# Patient Record
Sex: MALE | Race: BLACK OR AFRICAN AMERICAN | NOT HISPANIC OR LATINO | URBAN - METROPOLITAN AREA
[De-identification: names, ages, dates, MRNs, and addresses within clinical notes are randomized per-mention and may not be internally consistent; named-entity substitution may affect disease eponyms.]

---

## 2020-04-06 ENCOUNTER — INPATIENT (INPATIENT)
Facility: HOSPITAL | Age: 75
LOS: 7 days | Discharge: HOME HEALTH | DRG: 177 | End: 2020-04-14
Attending: PEDIATRICS | Admitting: STUDENT IN AN ORGANIZED HEALTH CARE EDUCATION/TRAINING PROGRAM
Payer: COMMERCIAL

## 2020-04-06 VITALS
DIASTOLIC BLOOD PRESSURE: 72 MMHG | SYSTOLIC BLOOD PRESSURE: 113 MMHG | RESPIRATION RATE: 16 BRPM | HEIGHT: 67 IN | TEMPERATURE: 99 F | HEART RATE: 116 BPM | OXYGEN SATURATION: 91 % | WEIGHT: 154.98 LBS

## 2020-04-06 DIAGNOSIS — E87.0 HYPEROSMOLALITY AND HYPERNATREMIA: ICD-10-CM

## 2020-04-06 DIAGNOSIS — R09.02 HYPOXEMIA: ICD-10-CM

## 2020-04-06 DIAGNOSIS — G93.40 ENCEPHALOPATHY, UNSPECIFIED: ICD-10-CM

## 2020-04-06 DIAGNOSIS — R68.89 OTHER GENERAL SYMPTOMS AND SIGNS: ICD-10-CM

## 2020-04-06 DIAGNOSIS — Z29.9 ENCOUNTER FOR PROPHYLACTIC MEASURES, UNSPECIFIED: ICD-10-CM

## 2020-04-06 DIAGNOSIS — R79.89 OTHER SPECIFIED ABNORMAL FINDINGS OF BLOOD CHEMISTRY: ICD-10-CM

## 2020-04-06 DIAGNOSIS — Z71.89 OTHER SPECIFIED COUNSELING: ICD-10-CM

## 2020-04-06 DIAGNOSIS — Z98.890 OTHER SPECIFIED POSTPROCEDURAL STATES: Chronic | ICD-10-CM

## 2020-04-06 LAB
ALBUMIN SERPL ELPH-MCNC: 3.3 G/DL — SIGNIFICANT CHANGE UP (ref 3.3–5)
ALP SERPL-CCNC: 66 U/L — SIGNIFICANT CHANGE UP (ref 40–120)
ALT FLD-CCNC: 49 U/L — HIGH (ref 10–45)
ANION GAP SERPL CALC-SCNC: 9 MMOL/L — SIGNIFICANT CHANGE UP (ref 5–17)
APPEARANCE UR: CLEAR — SIGNIFICANT CHANGE UP
AST SERPL-CCNC: 77 U/L — HIGH (ref 10–40)
BACTERIA # UR AUTO: NEGATIVE — SIGNIFICANT CHANGE UP
BASOPHILS # BLD AUTO: 0 K/UL — SIGNIFICANT CHANGE UP (ref 0–0.2)
BASOPHILS NFR BLD AUTO: 0 % — SIGNIFICANT CHANGE UP (ref 0–2)
BILIRUB SERPL-MCNC: 2.6 MG/DL — HIGH (ref 0.2–1.2)
BILIRUB UR-MCNC: ABNORMAL
BUN SERPL-MCNC: 21 MG/DL — SIGNIFICANT CHANGE UP (ref 7–23)
CALCIUM SERPL-MCNC: 9.2 MG/DL — SIGNIFICANT CHANGE UP (ref 8.4–10.5)
CHLORIDE SERPL-SCNC: 135 MMOL/L — HIGH (ref 96–108)
CO2 SERPL-SCNC: 23 MMOL/L — SIGNIFICANT CHANGE UP (ref 22–31)
COLOR SPEC: ABNORMAL
CREAT SERPL-MCNC: 1.09 MG/DL — SIGNIFICANT CHANGE UP (ref 0.5–1.3)
D DIMER BLD IA.RAPID-MCNC: 454 NG/ML DDU — HIGH
DIFF PNL FLD: ABNORMAL
EOSINOPHIL # BLD AUTO: 0 K/UL — SIGNIFICANT CHANGE UP (ref 0–0.5)
EOSINOPHIL NFR BLD AUTO: 0 % — SIGNIFICANT CHANGE UP (ref 0–6)
EPI CELLS # UR: 2 /HPF — SIGNIFICANT CHANGE UP
ERYTHROCYTE [SEDIMENTATION RATE] IN BLOOD: 66 MM/HR — HIGH (ref 0–20)
FIBRINOGEN PPP-MCNC: 511 MG/DL — HIGH (ref 350–510)
GAS PNL BLDV: SIGNIFICANT CHANGE UP
GLUCOSE SERPL-MCNC: 135 MG/DL — HIGH (ref 70–99)
GLUCOSE UR QL: NEGATIVE — SIGNIFICANT CHANGE UP
HCT VFR BLD CALC: 35.6 % — LOW (ref 39–50)
HGB BLD-MCNC: 11 G/DL — LOW (ref 13–17)
HYALINE CASTS # UR AUTO: 6 /LPF — HIGH (ref 0–2)
IMM GRANULOCYTES NFR BLD AUTO: 0.5 % — SIGNIFICANT CHANGE UP (ref 0–1.5)
KETONES UR-MCNC: ABNORMAL
LDH SERPL L TO P-CCNC: 564 U/L — HIGH (ref 50–242)
LEUKOCYTE ESTERASE UR-ACNC: NEGATIVE — SIGNIFICANT CHANGE UP
LYMPHOCYTES # BLD AUTO: 0.67 K/UL — LOW (ref 1–3.3)
LYMPHOCYTES # BLD AUTO: 30.6 % — SIGNIFICANT CHANGE UP (ref 13–44)
MCHC RBC-ENTMCNC: 30.3 PG — SIGNIFICANT CHANGE UP (ref 27–34)
MCHC RBC-ENTMCNC: 30.9 GM/DL — LOW (ref 32–36)
MCV RBC AUTO: 98.1 FL — SIGNIFICANT CHANGE UP (ref 80–100)
MONOCYTES # BLD AUTO: 0.24 K/UL — SIGNIFICANT CHANGE UP (ref 0–0.9)
MONOCYTES NFR BLD AUTO: 11 % — SIGNIFICANT CHANGE UP (ref 2–14)
NEUTROPHILS # BLD AUTO: 1.27 K/UL — LOW (ref 1.8–7.4)
NEUTROPHILS NFR BLD AUTO: 57.9 % — SIGNIFICANT CHANGE UP (ref 43–77)
NITRITE UR-MCNC: NEGATIVE — SIGNIFICANT CHANGE UP
NRBC # BLD: 0 /100 WBCS — SIGNIFICANT CHANGE UP (ref 0–0)
PH UR: 6 — SIGNIFICANT CHANGE UP (ref 5–8)
PLATELET # BLD AUTO: 207 K/UL — SIGNIFICANT CHANGE UP (ref 150–400)
POTASSIUM SERPL-MCNC: 3.8 MMOL/L — SIGNIFICANT CHANGE UP (ref 3.5–5.3)
POTASSIUM SERPL-SCNC: 3.8 MMOL/L — SIGNIFICANT CHANGE UP (ref 3.5–5.3)
PROCALCITONIN SERPL-MCNC: 0.13 NG/ML — HIGH (ref 0.02–0.1)
PROT SERPL-MCNC: 6.6 G/DL — SIGNIFICANT CHANGE UP (ref 6–8.3)
PROT UR-MCNC: 100 — SIGNIFICANT CHANGE UP
RBC # BLD: 3.63 M/UL — LOW (ref 4.2–5.8)
RBC # FLD: 11.8 % — SIGNIFICANT CHANGE UP (ref 10.3–14.5)
RBC CASTS # UR COMP ASSIST: 4 /HPF — SIGNIFICANT CHANGE UP (ref 0–4)
SARS-COV-2 RNA SPEC QL NAA+PROBE: DETECTED
SODIUM SERPL-SCNC: 167 MMOL/L — CRITICAL HIGH (ref 135–145)
SP GR SPEC: 1.01 — SIGNIFICANT CHANGE UP (ref 1.01–1.02)
UROBILINOGEN FLD QL: ABNORMAL
WBC # BLD: 2.19 K/UL — LOW (ref 3.8–10.5)
WBC # FLD AUTO: 2.19 K/UL — LOW (ref 3.8–10.5)
WBC UR QL: 12 /HPF — HIGH (ref 0–5)

## 2020-04-06 PROCEDURE — 93010 ELECTROCARDIOGRAM REPORT: CPT | Mod: NC

## 2020-04-06 PROCEDURE — 70450 CT HEAD/BRAIN W/O DYE: CPT | Mod: 26

## 2020-04-06 PROCEDURE — 99221 1ST HOSP IP/OBS SF/LOW 40: CPT | Mod: AI

## 2020-04-06 PROCEDURE — 71045 X-RAY EXAM CHEST 1 VIEW: CPT | Mod: 26

## 2020-04-06 PROCEDURE — 99285 EMERGENCY DEPT VISIT HI MDM: CPT

## 2020-04-06 RX ORDER — ENOXAPARIN SODIUM 100 MG/ML
40 INJECTION SUBCUTANEOUS DAILY
Refills: 0 | Status: DISCONTINUED | OUTPATIENT
Start: 2020-04-06 | End: 2020-04-14

## 2020-04-06 RX ORDER — SODIUM CHLORIDE 9 MG/ML
1000 INJECTION INTRAMUSCULAR; INTRAVENOUS; SUBCUTANEOUS
Refills: 0 | Status: DISCONTINUED | OUTPATIENT
Start: 2020-04-06 | End: 2020-04-06

## 2020-04-06 RX ORDER — SODIUM CHLORIDE 9 MG/ML
1000 INJECTION, SOLUTION INTRAVENOUS
Refills: 0 | Status: DISCONTINUED | OUTPATIENT
Start: 2020-04-06 | End: 2020-04-07

## 2020-04-06 RX ADMIN — SODIUM CHLORIDE 100 MILLILITER(S): 9 INJECTION, SOLUTION INTRAVENOUS at 23:30

## 2020-04-06 NOTE — H&P ADULT - ASSESSMENT
74M no PMH (on no medications) who presented to the ED with generalized weakness and confusion in the setting of poor PO and depressed mood, found to be hypernatremic and hypoxic with suspicion for COVID infection.

## 2020-04-06 NOTE — H&P ADULT - PROBLEM SELECTOR PLAN 7
Discussed GOC with patient's daughter, Julieta.  Patient is full code at this time.  HCPs are patient's wife and daughter [phone]

## 2020-04-06 NOTE — ED PROVIDER NOTE - OBJECTIVE STATEMENT
75 y/o M without PMH presents to ED BIBEMS for weakness. Patient denies pain at this time. Knows first name. Unsure of last name, where he is, and his birthday.   History given by his daughter over the phone. States that since March 26, when patient's mother passed away he has been "depressed," with total body weakness, with decreased appetite. States that he has not been acting like himself. Baseline A+Ox3. Denies any vomiting or diarrhea. Denies PMH and daily medications. Patient lives with daughter.   Daughter is a EMT, gave patient 2L IVF.   PCP: Ravin Galvan   Daughter: [phone]

## 2020-04-06 NOTE — H&P ADULT - NSHPREVIEWOFSYSTEMS_GEN_ALL_CORE
A&O x1  As per HPI, daughter noted depressed mood, generalized weakness, confusion, decreased appetite.

## 2020-04-06 NOTE — ED ADULT NURSE REASSESSMENT NOTE - NS ED NURSE REASSESS COMMENT FT1
Report received from MAGDALENA Damon. Pt is resting comfortably in bed. Pt A&Ox1 (Only to first name, as per daughter pt baseline is A&Ox3). Pt informed of POC and awaiting COVID test and radiology. Pt has no complaints at this time. Pt safety maintained.

## 2020-04-06 NOTE — H&P ADULT - NSHPLABSRESULTS_GEN_ALL_CORE
EK2020 20:20  Rate: 100  Interpretation: normal  Axis: Normal  WI: 120  QRS: 78  ·Other Findings: non specific ST abnormality, qtc 410 Labs:                         11.0   2.19  )-----------( 207      ( 2020 19:08 )             35.6   04-06    167<HH>  |  135<H>  |  21  ----------------------------<  135<H>  3.8   |  23  |  1.09    Ca    9.2      2020 19:08    TPro  6.6  /  Alb  3.3  /  TBili  2.6<H>  /  DBili  x   /  AST  77<H>  /  ALT  49<H>  /  AlkPhos  66  04-    , Procalcitonin 0.13, D-dimer 454, Fibrinogen 511. Urinalysis - 12 WBC, trace blood, 8 urobilinogen      Imaging:     < from: CT Head No Cont (20 @ 19:35) >    FINDINGS:      There is no hydrocephalus, mass effect, midline shift, vasogenic edema, or acute intracranial hemorrhage.  There is no CT evidence of acute territorial infarct.      The visualized paranasal sinuses and mastoid air cells are clear.    IMPRESSION:    No acute intracranial hemorrhage, mass effect, vasogenic edema, or evidence of acute territorial infarct.    < end of copied text >      < from: Xray Chest 1 View AP/PA (20 @ 19:20) >    IMPRESSION: Patchy opacities in the left lower lung field and likely the right upper lobe, which may be secondary to infection including atypical infection given the current environment.    < end of copied text >        EK2020 20:20  Rate: 100  Interpretation: normal  Axis: Normal  KS: 120  QRS: 78  ·Other Findings: non specific ST abnormality, qtc 410

## 2020-04-06 NOTE — H&P ADULT - HISTORY OF PRESENT ILLNESS
Portions of this History and Physical Exam are adopted from ER Provider Notation per [facility] policy to limit healthcare provider exposure during COVID19 Pandemic    74M PMH  presented to the ED by EMS with generalized weakness and confusion. Per daughter (EMT) he is A&Ox3 at baseline, his mother passed away 3/26 and he has since been depressed, with total body weakness, decreased appetite, and change in behavior. Denied vomiting, diarrhea, pain.   His daughter is an EMT, and she gave him 2L IV saline prior to arrival. Portions of this History and Physical Exam are adopted from ER Provider Notation per [facility] policy to limit healthcare provider exposure during COVID19 Pandemic    74M no PMH (on no medications) who presented to the ED by EMS with generalized weakness and confusion. Per daughter (EMT) he is A&Ox3 at baseline, his mother passed away 3/26 and he has since been depressed, with total body weakness, decreased appetite, and change in behavior. Denied vomiting, diarrhea, pain, fever.   His daughter is an EMT, and she gave him 2L IV saline prior to arrival.    In the ED, he was altered from baseline at A&Ox1, afebrile, tachycardic to 116 bpm, BP (116-130)/(66-78), RR 16-23, SpO2 88% on RA improved to 98% on 2L NC.   Initial labs: WBC 2.19, Hgb 11.0, Plt 207, Na 167, K 3.8, BUN 21, Cr 1.09, T-bili 2.6, AST/ALT 77/45, , Procalcitonin 0.13, D-dimer 454, Fibrinogen 511. Urinalysis - 12 WBC, trace blood, 8 urobilinogen  Initial Imaging: CT Head wnl, CXR with patchy opacities in the LLL field and possibly RUL field, consistent w/ possible infection.    In the ED, he was started on IV  cc/hr. BCx x2, UCx, and COVID test sent.

## 2020-04-06 NOTE — ED ADULT TRIAGE NOTE - MODE OF ARRIVAL
EMS Detail Level: Detailed Ambulance Add 49098 Cpt? (Important Note: In 2017 The Use Of 91904 Is Being Tracked By Cms To Determine Future Global Period Reimbursement For Global Periods): no

## 2020-04-06 NOTE — H&P ADULT - NSHPSOCIALHISTORY_GEN_ALL_CORE
Lives with daughter. Lives with daughter.   Former smoker (quit 36 years ago, smoked for ~25 years).  No alcohol or drug use.

## 2020-04-06 NOTE — H&P ADULT - PROBLEM SELECTOR PLAN 5
Patient noted to have transaminitis AST/ALT 77/45, and elevated T-bili 2.6. INR pending. Daughter denies significant history of alcohol use. Transaminitis may be explained by potential COVID infection, although it is unclear whether bilirubin elevation is predominantly direct or indirect.  - Follow-up repeat bilirubin and D-bilirubin level. Follow-up haptoglobin and repeat LDH in the setting of anemia. If primarily direct, may consider RUQ US.  - Trend transaminases  - f/u hepatitis panel  - Minimize hepatotoxic agents

## 2020-04-06 NOTE — ED PROVIDER NOTE - ATTENDING CONTRIBUTION TO CARE
attending Oumou: 74yM no PMH BIBEMS from home for generalized weakness. Pt confused on initial interview, unable to provide history. As per patient's daughter, pt with "depression" and no PO intake since death of his mother last week. Baseline oriented x3. Will obtain FSG, rectal temp, labs, UA, CT head for confusion, admit

## 2020-04-06 NOTE — H&P ADULT - PROBLEM SELECTOR PLAN 2
In the setting of depression s/p passing of his mother, with very poor PO, now found to be severely hypernatremic with hypoxia due to suspected COVID infection. Non-focal and CTH negative.  - Supportive correction of metabolic and infectious etiology  - Monitor for acute changes in mental status.

## 2020-04-06 NOTE — ED PROVIDER NOTE - PHYSICAL EXAMINATION
GEN: Nontoxic, NAD  HEENT: NC/AT, Symm Facies.   CV:  +S1S2, RRR w/o m/g/r  RESP: CTAB w/o w/r/r  ABD: Soft, nt/nd  EXT/MSK: No lower extremity edema or calf tenderness.   SKIN: warm  Neuro: AOX1 with normal speech, limited ability follow verbal commands GEN: Nontoxic, NAD  HEENT: NC/AT, Symm Facies. Green liquid in mouth, unknown if food v. vomit (per daughter patient has not been vomiting)  CV:  +S1S2, RRR w/o m/g/r  RESP: CTAB w/o w/r/r  ABD: Soft, nt/nd  EXT/MSK: No lower extremity edema or calf tenderness.   SKIN: warm  Neuro: AOX1 with normal speech, limited ability follow verbal commands GEN: Nontoxic, NAD  HEENT: NC/AT, Symm Facies. Small amount of green liquid in mouth, unknown if food v. vomit (per daughter patient has not been vomiting, has been feeding him)  CV:  +S1S2, RRR w/o m/g/r  RESP: CTAB w/o w/r/r  ABD: Soft, nt/nd  EXT/MSK: No lower extremity edema or calf tenderness.   SKIN: warm  Neuro: AOX1 with normal speech, limited ability follow verbal commands

## 2020-04-06 NOTE — H&P ADULT - PROBLEM SELECTOR PLAN 4
In the setting of suspected COVID infection. Comfortable on 2L NC at this time.  - Monitor respiratory status and treat supportively

## 2020-04-06 NOTE — H&P ADULT - PROBLEM SELECTOR PLAN 3
Likely in the setting of suspected dehydration from poor PO over the past 2 weeks. Patient received 2L IV fluid in the field by his daughter (EMT) prior to arrival.  Serum Na 167 despite IV NS repletion. Started on additional IV NS in the ED.  - Will start D5 + 1/2 NS, trend Na q6h. Goal correction <10 mEq/24hr

## 2020-04-06 NOTE — ED ADULT NURSE NOTE - OBJECTIVE STATEMENT
74 male without PMH presents to ED BIBEMS for weakness. Patient denies pain at this time. Knows first name. Unsure of last name, where he is, and his birthday. History given by his daughter over the phone. States that since March 26, when patient's mother passed away he has been "depressed," with total body weakness, with decreased appetite. States that he has not been acting like himself. Baseline A+Ox3. Denies any vomiting or diarrhea. Denies PMH and daily medications. Patient lives with daughter. Pt dry mouth has yellow crust in it and Pt unable to answer any questions even his name.  pox 88 room air andplaced on 2 lnc.  no fever noted and IVL placed and bloods sent as ordered Tiffany

## 2020-04-06 NOTE — ED PROVIDER NOTE - CARE PLAN
Principal Discharge DX:	Suspected han coronavirus infection  Secondary Diagnosis:	Change in mental status

## 2020-04-06 NOTE — ED ADULT NURSE NOTE - NSIMPLEMENTINTERV_GEN_ALL_ED
Implemented All Fall with Harm Risk Interventions:  [place] to call system. Call bell, personal items and telephone within reach. Instruct patient to call for assistance. Room bathroom lighting operational. Non-slip footwear when patient is off stretcher. Physically safe environment: no spills, clutter or unnecessary equipment. Stretcher in lowest position, wheels locked, appropriate side rails in place. Provide visual cue, wrist band, yellow gown, etc. Monitor gait and stability. Monitor for mental status changes and reorient to person, place, and time. Review medications for side effects contributing to fall risk. Reinforce activity limits and safety measures with patient and family. Provide visual clues: red socks.

## 2020-04-06 NOTE — H&P ADULT - PROBLEM SELECTOR PLAN 1
- COVID19 PCR collected and is PENDING  - Chest imaging consistent with multifocal pneumonia  - Continue with supplemental oxygen with goal SpO2>92, if requires escalate to NRB and avoid BiLevel/High Flow Nasal Cannula per institution policy given risk of aerosolization  - Pending evaluation for hydroxychloroquine versus Remdesivir  - Holding abx at this time  - obtain cbc with diff, CMP w/ lytes, coag, D-dimer, procalcitonin, CRP, LDH, Ferritin, Lactate, G6PD  - monitor EKG for QTc prolongation  - isolation precautions

## 2020-04-06 NOTE — H&P ADULT - NSHPPHYSICALEXAM_GEN_ALL_CORE
Portions of this History and Physical Exam are adopted from ER Provider Notation per [facility] policy to limit healthcare provider exposure during COVID19 Pandemic    Physical Examination:   GEN: Nontoxic, NAD  HEENT: NC/AT, Symm Facies. Small amount of green liquid in mouth, unknown if food v. vomit (per daughter patient has not been vomiting, has been feeding him)  CV:  +S1S2, RRR w/o m/g/r  RESP: CTAB w/o w/r/r  ABD: Soft, nt/nd  EXT/MSK: No lower extremity edema or calf tenderness.   SKIN: warm  Neuro: AOX1 with normal speech, limited ability follow verbal commands Portions of this History and Physical Exam are adopted from ER Provider Notation per [facility] policy to limit healthcare provider exposure during COVID19 Pandemic    Physical Examination:   GEN: Nontoxic, NAD  HEENT: NC/AT, dry mucosa, Symm Facies. Small amount of green liquid in mouth, unknown if food v. vomit (per daughter patient has not been vomiting, has been feeding him)  CV:  +S1S2, RRR w/o m/g/r  RESP: CTAB w/o w/r/r  ABD: Soft, nt/nd  EXT/MSK: No lower extremity edema or calf tenderness.   SKIN: warm  Neuro: AOX1 with normal speech, limited ability follow verbal commands

## 2020-04-07 DIAGNOSIS — U07.1 COVID-19: ICD-10-CM

## 2020-04-07 LAB
ALBUMIN SERPL ELPH-MCNC: 3.1 G/DL — LOW (ref 3.3–5)
ALBUMIN SERPL ELPH-MCNC: 3.4 G/DL — SIGNIFICANT CHANGE UP (ref 3.3–5)
ALP SERPL-CCNC: 70 U/L — SIGNIFICANT CHANGE UP (ref 40–120)
ALP SERPL-CCNC: 73 U/L — SIGNIFICANT CHANGE UP (ref 40–120)
ALT FLD-CCNC: 45 U/L — SIGNIFICANT CHANGE UP (ref 10–45)
ALT FLD-CCNC: 49 U/L — HIGH (ref 10–45)
AMMONIA BLD-MCNC: 11 UMOL/L — SIGNIFICANT CHANGE UP (ref 11–55)
ANION GAP SERPL CALC-SCNC: 12 MMOL/L — SIGNIFICANT CHANGE UP (ref 5–17)
ANION GAP SERPL CALC-SCNC: 9 MMOL/L — SIGNIFICANT CHANGE UP (ref 5–17)
APTT BLD: 34.2 SEC — SIGNIFICANT CHANGE UP (ref 27.5–36.3)
AST SERPL-CCNC: 70 U/L — HIGH (ref 10–40)
AST SERPL-CCNC: 80 U/L — HIGH (ref 10–40)
BILIRUB DIRECT SERPL-MCNC: 1.2 MG/DL — HIGH (ref 0–0.2)
BILIRUB SERPL-MCNC: 2.5 MG/DL — HIGH (ref 0.2–1.2)
BILIRUB SERPL-MCNC: 2.5 MG/DL — HIGH (ref 0.2–1.2)
BUN SERPL-MCNC: 18 MG/DL — SIGNIFICANT CHANGE UP (ref 7–23)
BUN SERPL-MCNC: 19 MG/DL — SIGNIFICANT CHANGE UP (ref 7–23)
CALCIUM SERPL-MCNC: 9.4 MG/DL — SIGNIFICANT CHANGE UP (ref 8.4–10.5)
CALCIUM SERPL-MCNC: 9.6 MG/DL — SIGNIFICANT CHANGE UP (ref 8.4–10.5)
CHLORIDE SERPL-SCNC: 135 MMOL/L — HIGH (ref 96–108)
CHLORIDE SERPL-SCNC: 135 MMOL/L — HIGH (ref 96–108)
CO2 SERPL-SCNC: 21 MMOL/L — LOW (ref 22–31)
CO2 SERPL-SCNC: 24 MMOL/L — SIGNIFICANT CHANGE UP (ref 22–31)
CREAT SERPL-MCNC: 0.87 MG/DL — SIGNIFICANT CHANGE UP (ref 0.5–1.3)
CREAT SERPL-MCNC: 1.02 MG/DL — SIGNIFICANT CHANGE UP (ref 0.5–1.3)
CRP SERPL-MCNC: 2.2 MG/DL — HIGH (ref 0–0.4)
CULTURE RESULTS: NO GROWTH — SIGNIFICANT CHANGE UP
FERRITIN SERPL-MCNC: 3157 NG/ML — HIGH (ref 30–400)
GLUCOSE SERPL-MCNC: 163 MG/DL — HIGH (ref 70–99)
GLUCOSE SERPL-MCNC: 173 MG/DL — HIGH (ref 70–99)
HAPTOGLOB SERPL-MCNC: 138 MG/DL — SIGNIFICANT CHANGE UP (ref 34–200)
HAV IGM SER-ACNC: SIGNIFICANT CHANGE UP
HBA1C BLD-MCNC: 5.6 % — SIGNIFICANT CHANGE UP (ref 4–5.6)
HBV CORE IGM SER-ACNC: SIGNIFICANT CHANGE UP
HBV SURFACE AG SER-ACNC: SIGNIFICANT CHANGE UP
HCT VFR BLD CALC: 37.5 % — LOW (ref 39–50)
HCV AB S/CO SERPL IA: 0.33 S/CO — SIGNIFICANT CHANGE UP (ref 0–0.99)
HCV AB SERPL-IMP: SIGNIFICANT CHANGE UP
HGB BLD-MCNC: 11.6 G/DL — LOW (ref 13–17)
INR BLD: 1.41 RATIO — HIGH (ref 0.88–1.16)
IRON SATN MFR SERPL: 13 % — LOW (ref 16–55)
IRON SATN MFR SERPL: 25 UG/DL — LOW (ref 45–165)
LDH SERPL L TO P-CCNC: 582 U/L — HIGH (ref 50–242)
MAGNESIUM SERPL-MCNC: 2 MG/DL — SIGNIFICANT CHANGE UP (ref 1.6–2.6)
MAGNESIUM SERPL-MCNC: 2 MG/DL — SIGNIFICANT CHANGE UP (ref 1.6–2.6)
MAGNESIUM SERPL-MCNC: 2.1 MG/DL — SIGNIFICANT CHANGE UP (ref 1.6–2.6)
MCHC RBC-ENTMCNC: 30.1 PG — SIGNIFICANT CHANGE UP (ref 27–34)
MCHC RBC-ENTMCNC: 30.9 GM/DL — LOW (ref 32–36)
MCV RBC AUTO: 97.2 FL — SIGNIFICANT CHANGE UP (ref 80–100)
NRBC # BLD: 0 /100 WBCS — SIGNIFICANT CHANGE UP (ref 0–0)
PHOSPHATE SERPL-MCNC: 0.8 MG/DL — CRITICAL LOW (ref 2.5–4.5)
PHOSPHATE SERPL-MCNC: 1.9 MG/DL — LOW (ref 2.5–4.5)
PHOSPHATE SERPL-MCNC: 3.1 MG/DL — SIGNIFICANT CHANGE UP (ref 2.5–4.5)
PLATELET # BLD AUTO: 231 K/UL — SIGNIFICANT CHANGE UP (ref 150–400)
POTASSIUM SERPL-MCNC: 3.2 MMOL/L — LOW (ref 3.5–5.3)
POTASSIUM SERPL-MCNC: 3.3 MMOL/L — LOW (ref 3.5–5.3)
POTASSIUM SERPL-SCNC: 3.2 MMOL/L — LOW (ref 3.5–5.3)
POTASSIUM SERPL-SCNC: 3.3 MMOL/L — LOW (ref 3.5–5.3)
PROT SERPL-MCNC: 6.3 G/DL — SIGNIFICANT CHANGE UP (ref 6–8.3)
PROT SERPL-MCNC: 6.7 G/DL — SIGNIFICANT CHANGE UP (ref 6–8.3)
PROTHROM AB SERPL-ACNC: 16.4 SEC — HIGH (ref 10–12.9)
RBC # BLD: 3.86 M/UL — LOW (ref 4.2–5.8)
RBC # FLD: 11.9 % — SIGNIFICANT CHANGE UP (ref 10.3–14.5)
SODIUM SERPL-SCNC: 168 MMOL/L — CRITICAL HIGH (ref 135–145)
SODIUM SERPL-SCNC: 168 MMOL/L — CRITICAL HIGH (ref 135–145)
SPECIMEN SOURCE: SIGNIFICANT CHANGE UP
TIBC SERPL-MCNC: 192 UG/DL — LOW (ref 220–430)
TSH SERPL-MCNC: 0.63 UIU/ML — SIGNIFICANT CHANGE UP (ref 0.27–4.2)
UIBC SERPL-MCNC: 168 UG/DL — SIGNIFICANT CHANGE UP (ref 110–370)
WBC # BLD: 2.45 K/UL — LOW (ref 3.8–10.5)
WBC # FLD AUTO: 2.45 K/UL — LOW (ref 3.8–10.5)

## 2020-04-07 PROCEDURE — 99253 IP/OBS CNSLTJ NEW/EST LOW 45: CPT

## 2020-04-07 PROCEDURE — 99233 SBSQ HOSP IP/OBS HIGH 50: CPT | Mod: GC

## 2020-04-07 RX ORDER — SODIUM,POTASSIUM PHOSPHATES 278-250MG
1 POWDER IN PACKET (EA) ORAL ONCE
Refills: 0 | Status: DISCONTINUED | OUTPATIENT
Start: 2020-04-07 | End: 2020-04-07

## 2020-04-07 RX ORDER — SODIUM,POTASSIUM PHOSPHATES 278-250MG
1 POWDER IN PACKET (EA) ORAL ONCE
Refills: 0 | Status: COMPLETED | OUTPATIENT
Start: 2020-04-07 | End: 2020-04-07

## 2020-04-07 RX ORDER — HYDROXYCHLOROQUINE SULFATE 200 MG
400 TABLET ORAL EVERY 12 HOURS
Refills: 0 | Status: COMPLETED | OUTPATIENT
Start: 2020-04-07 | End: 2020-04-08

## 2020-04-07 RX ORDER — SODIUM CHLORIDE 9 MG/ML
1000 INJECTION, SOLUTION INTRAVENOUS
Refills: 0 | Status: DISCONTINUED | OUTPATIENT
Start: 2020-04-07 | End: 2020-04-09

## 2020-04-07 RX ORDER — HYDROXYCHLOROQUINE SULFATE 200 MG
200 TABLET ORAL EVERY 12 HOURS
Refills: 0 | Status: COMPLETED | OUTPATIENT
Start: 2020-04-08 | End: 2020-04-11

## 2020-04-07 RX ORDER — SODIUM CHLORIDE 9 MG/ML
1000 INJECTION, SOLUTION INTRAVENOUS
Refills: 0 | Status: DISCONTINUED | OUTPATIENT
Start: 2020-04-07 | End: 2020-04-07

## 2020-04-07 RX ORDER — POTASSIUM PHOSPHATE, MONOBASIC POTASSIUM PHOSPHATE, DIBASIC 236; 224 MG/ML; MG/ML
30 INJECTION, SOLUTION INTRAVENOUS ONCE
Refills: 0 | Status: DISCONTINUED | OUTPATIENT
Start: 2020-04-07 | End: 2020-04-07

## 2020-04-07 RX ORDER — POTASSIUM PHOSPHATE, MONOBASIC POTASSIUM PHOSPHATE, DIBASIC 236; 224 MG/ML; MG/ML
15 INJECTION, SOLUTION INTRAVENOUS ONCE
Refills: 0 | Status: DISCONTINUED | OUTPATIENT
Start: 2020-04-07 | End: 2020-04-07

## 2020-04-07 RX ORDER — HYDROXYCHLOROQUINE SULFATE 200 MG
TABLET ORAL
Refills: 0 | Status: COMPLETED | OUTPATIENT
Start: 2020-04-07 | End: 2020-04-12

## 2020-04-07 RX ORDER — POTASSIUM PHOSPHATE, MONOBASIC POTASSIUM PHOSPHATE, DIBASIC 236; 224 MG/ML; MG/ML
15 INJECTION, SOLUTION INTRAVENOUS ONCE
Refills: 0 | Status: COMPLETED | OUTPATIENT
Start: 2020-04-07 | End: 2020-04-07

## 2020-04-07 RX ORDER — POTASSIUM CHLORIDE 20 MEQ
40 PACKET (EA) ORAL EVERY 4 HOURS
Refills: 0 | Status: DISCONTINUED | OUTPATIENT
Start: 2020-04-07 | End: 2020-04-07

## 2020-04-07 RX ORDER — POTASSIUM CHLORIDE 20 MEQ
40 PACKET (EA) ORAL ONCE
Refills: 0 | Status: COMPLETED | OUTPATIENT
Start: 2020-04-07 | End: 2020-04-07

## 2020-04-07 RX ADMIN — Medication 40 MILLIEQUIVALENT(S): at 17:25

## 2020-04-07 RX ADMIN — Medication 62.5 MILLIMOLE(S): at 06:39

## 2020-04-07 RX ADMIN — SODIUM CHLORIDE 150 MILLILITER(S): 9 INJECTION, SOLUTION INTRAVENOUS at 17:25

## 2020-04-07 RX ADMIN — Medication 400 MILLIGRAM(S): at 14:29

## 2020-04-07 RX ADMIN — SODIUM CHLORIDE 100 MILLILITER(S): 9 INJECTION, SOLUTION INTRAVENOUS at 06:39

## 2020-04-07 RX ADMIN — Medication 1 PACKET(S): at 12:36

## 2020-04-07 RX ADMIN — POTASSIUM PHOSPHATE, MONOBASIC POTASSIUM PHOSPHATE, DIBASIC 62.5 MILLIMOLE(S): 236; 224 INJECTION, SOLUTION INTRAVENOUS at 18:13

## 2020-04-07 RX ADMIN — ENOXAPARIN SODIUM 40 MILLIGRAM(S): 100 INJECTION SUBCUTANEOUS at 12:36

## 2020-04-07 NOTE — PROGRESS NOTE ADULT - PROBLEM SELECTOR PLAN 3
Likely in the setting of suspected dehydration from poor PO over the past 2 weeks. Patient received 2L IV fluid in the field by his daughter (EMT) prior to arrival.  Serum Na 167 despite IV NS repletion. Started on additional IV NS in the ED.  - Will start D5 + 1/2 NS, trend Na q6h. Goal correction <10 mEq/24hr Likely in the setting of suspected dehydration from poor PO over the past 2 weeks. Patient received 2L IV fluid in the field by his daughter (EMT) prior to arrival.  Serum Na 167 despite IV NS repletion. Started on additional IV NS in the ED.  - Dr. Stovall from nephrology consulted - despite being on d5w 100 cc/hr for 12 hours, no improvement in Na - may be 2/2 kinked IV tube, which was replaced.  --started on d5w 150 cc/hr, will monitor midnight bmp and adjust accordingly, goal Na is 158-160 in 24h, will also replace electrolytes PRN

## 2020-04-07 NOTE — PROGRESS NOTE ADULT - ATTENDING COMMENTS
74M no PMH (on no medications) who presented to the ED with generalized weakness and confusion in the setting of poor PO and depressed mood, found to be hypernatremic and hypoxic with COVID infection.  - Continue monitor  - DVT prop  - Nephro consult for Hypernatremia, monitor electrolytes closely  - Start Plaguenil  - Wean O2 as tolerated  Discussed case with medicine intern.

## 2020-04-07 NOTE — PROGRESS NOTE ADULT - PROBLEM SELECTOR PLAN 7
Discussed GOC with patient's daughter, Julieta.  Patient is full code at this time.  HCPs are patient's wife and daughter [phone]

## 2020-04-07 NOTE — PROGRESS NOTE ADULT - SUBJECTIVE AND OBJECTIVE BOX
[facility] Division of Internal Medicine  Beatajose Coco, PGY-1  Pager: [facility]: [phone] | [facility]: [phone]    Patient is a 74y old  Male who presents with a chief complaint of Suspected COVID, hypoxia, AMS, Hypernatremia (2020 21:18)      SUBJECTIVE / OVERNIGHT EVENTS: No acute events overnight   ADDITIONAL REVIEW OF SYSTEMS:       MEDICATIONS  (STANDING):  dextrose 5%. 1000 milliLiter(s) (100 mL/Hr) IV Continuous <Continuous>  enoxaparin Injectable 40 milliGRAM(s) SubCutaneous daily  potassium phosphate IVPB 30 milliMole(s) IV Intermittent once    MEDICATIONS  (PRN):      CAPILLARY BLOOD GLUCOSE        I&O's Summary    2020 07:01  -  2020 07:00  --------------------------------------------------------  IN: 250 mL / OUT: 450 mL / NET: -200 mL    2020 07:01  -  2020 09:06  --------------------------------------------------------  IN: 0 mL / OUT: 200 mL / NET: -200 mL        PHYSICAL EXAM:  Vital Signs Last 24 Hrs  T(C): 36.8 (2020 04:47), Max: 37.3 (2020 18:18)  T(F): 98.2 (2020 04:47), Max: 99.1 (2020 18:18)  HR: 91 (2020 04:47) (91 - 116)  BP: 137/73 (2020 04:47) (113/72 - 137/73)  BP(mean): --  RR: 20 (2020 04:47) (16 - 23)  SpO2: 93% (2020 04:47) (88% - 98%)    CONSTITUTIONAL: NAD  EYES: conjunctiva and sclera clear  ENMT: Moist oral mucosa  RESPIRATORY: Normal respiratory effort; lungs are clear to auscultation bilaterally  CARDIOVASCULAR: Regular rate and rhythm, normal S1 and S2, no murmur/rub/gallop;  MUSCULOSKELETAL: no clubbing or cyanosis of digits; no joint swelling or tenderness to palpation  PSYCH: depressed mood, oriented person and place  NEUROLOGY: CN 2-12 are grossly intact and symmetric; no gross sensory deficits     LABS:                        11.6   2.45  )-----------( 231      ( 2020 02:56 )             37.5     04-07    168<HH>  |  135<H>  |  19  ----------------------------<  173<H>  3.3<L>   |  24  |  1.02    Ca    9.6      2020 02:56  Phos  0.8     04-07  Mg     2.1     04-07    TPro  6.7  /  Alb  3.4  /  TBili  2.5<H>  /  DBili  1.2<H>  /  AST  80<H>  /  ALT  49<H>  /  AlkPhos  73  04-07    PT/INR - ( 2020 02:56 )   PT: 16.4 sec;   INR: 1.41 ratio         PTT - ( 2020 02:56 )  PTT:34.2 sec      Urinalysis Basic - ( 2020 19:39 )    Color: Dark Yellow / Appearance: Clear / S.014 / pH: x  Gluc: x / Ketone: Small  / Bili: Small / Urobili: 8 mg/dL   Blood: x / Protein: 100 / Nitrite: Negative   Leuk Esterase: Negative / RBC: 4 /hpf / WBC 12 /HPF   Sq Epi: x / Non Sq Epi: 2 /hpf / Bacteria: Negative          RADIOLOGY & ADDITIONAL TESTS:  Results Reviewed:   Imaging Personally Reviewed:  Electrocardiogram Personally Reviewed:    COORDINATION OF CARE:  Care Discussed with Consultants/Other Providers [Y/N]:  Prior or Outpatient Records Reviewed [Y/N]: [facility] Division of Internal Medicine  Beatajose Coco, PGY-1  Pager: [facility]: [phone] | [facility]: [phone]    Patient is a 74y old  Male who presents with a chief complaint of Suspected COVID, hypoxia, AMS, Hypernatremia (2020 21:18)      SUBJECTIVE / OVERNIGHT EVENTS: No acute events overnight   ADDITIONAL REVIEW OF SYSTEMS: unable to assess full ROS due to altered mental status      MEDICATIONS  (STANDING):  dextrose 5%. 1000 milliLiter(s) (100 mL/Hr) IV Continuous <Continuous>  enoxaparin Injectable 40 milliGRAM(s) SubCutaneous daily  potassium phosphate IVPB 30 milliMole(s) IV Intermittent once    MEDICATIONS  (PRN):      CAPILLARY BLOOD GLUCOSE        I&O's Summary    2020 07:01  -  2020 07:00  --------------------------------------------------------  IN: 250 mL / OUT: 450 mL / NET: -200 mL    2020 07:01  -  2020 09:06  --------------------------------------------------------  IN: 0 mL / OUT: 200 mL / NET: -200 mL        PHYSICAL EXAM:  Vital Signs Last 24 Hrs  T(C): 36.8 (2020 04:47), Max: 37.3 (2020 18:18)  T(F): 98.2 (2020 04:47), Max: 99.1 (2020 18:18)  HR: 91 (2020 04:47) (91 - 116)  BP: 137/73 (2020 04:47) (113/72 - 137/73)  BP(mean): --  RR: 20 (2020 04:47) (16 - 23)  SpO2: 93% (2020 04:47) (88% - 98%)    CONSTITUTIONAL: NAD  EYES: conjunctiva and sclera clear  ENMT: Moist oral mucosa  RESPIRATORY: Normal respiratory effort; lungs are clear to auscultation bilaterally  CARDIOVASCULAR: Regular rate and rhythm, normal S1 and S2, no murmur/rub/gallop;  MUSCULOSKELETAL: no clubbing or cyanosis of digits; no joint swelling or tenderness to palpation  PSYCH: depressed mood, AAOx0  NEUROLOGY: CN 2-12 are grossly intact and symmetric; no gross sensory deficits     LABS:                        11.6   2.45  )-----------( 231      ( 2020 02:56 )             37.5     04-07    168<HH>  |  135<H>  |  19  ----------------------------<  173<H>  3.3<L>   |  24  |  1.02    Ca    9.6      2020 02:56  Phos  0.8     04-07  Mg     2.1     04-07    TPro  6.7  /  Alb  3.4  /  TBili  2.5<H>  /  DBili  1.2<H>  /  AST  80<H>  /  ALT  49<H>  /  AlkPhos  73  04-07    PT/INR - ( 2020 02:56 )   PT: 16.4 sec;   INR: 1.41 ratio         PTT - ( 2020 02:56 )  PTT:34.2 sec      Urinalysis Basic - ( 2020 19:39 )    Color: Dark Yellow / Appearance: Clear / S.014 / pH: x  Gluc: x / Ketone: Small  / Bili: Small / Urobili: 8 mg/dL   Blood: x / Protein: 100 / Nitrite: Negative   Leuk Esterase: Negative / RBC: 4 /hpf / WBC 12 /HPF   Sq Epi: x / Non Sq Epi: 2 /hpf / Bacteria: Negative          RADIOLOGY & ADDITIONAL TESTS:  Results Reviewed:   Imaging Personally Reviewed:  Electrocardiogram Personally Reviewed:    COORDINATION OF CARE:  Care Discussed with Consultants/Other Providers [Y/N]:  Prior or Outpatient Records Reviewed [Y/N]:

## 2020-04-07 NOTE — CONSULT NOTE ADULT - ASSESSMENT
74 year old male presented with change in mental status, noted to be hypernatremic and covid19 positive     #Hypernatremia-unclear duration but seems chronic (ie>48hours) based on history  -likely due to volume depletion/decreased po hydration and intake  -per team pt with no vomiting or diarrhea  -BP stable now, pt received volume resuscitation with NS  -okay to continue d5w but increase rate to 150cc/hr  -check sodium levels q6-8hr stat to make sure it is not increasing or decreasing too rapidly  -goal decrease approx not more than 8-10meq in next 24hours  -glucose control to avoid osmotic diuresis with resultant hypernatremia  -check u osm, serum os, urine sodium    #hypokalemia- replete    #hypophosphatemia- replete    #covid19 +- treatment per medicial team

## 2020-04-07 NOTE — PROGRESS NOTE ADULT - PROBLEM SELECTOR PLAN 1
- COVID19 PCR positive  - Chest imaging consistent with multifocal pneumonia  - Continue with supplemental oxygen with goal SpO2>92, if requires escalate to NRB and avoid BiLevel/High Flow Nasal Cannula per institution policy given risk of aerosolization  - Pending evaluation for hydroxychloroquine versus Remdesivir  - Holding abx at this time  - f/u cbc with diff, CMP w/ lytes, coag, D-dimer, procalcitonin, CRP, LDH, Ferritin, Lactate, G6PD  - monitor EKG for QTc prolongation  - isolation precautions

## 2020-04-07 NOTE — PROGRESS NOTE ADULT - PROBLEM SELECTOR PLAN 5
Patient noted to have transaminitis AST/ALT 77/45, and elevated T-bili 2.6. INR pending. Daughter denies significant history of alcohol use. Transaminitis may be explained by potential COVID infection, although it is unclear whether bilirubin elevation is predominantly direct or indirect.  - Follow-up repeat bilirubin and D-bilirubin level. Follow-up haptoglobin and repeat LDH in the setting of anemia. If primarily direct, may consider RUQ US.  - Trend transaminases  - f/u hepatitis panel  - Minimize hepatotoxic agents Patient noted to have transaminitis AST/ALT 77/45, and elevated T-bili 2.6. INR pending. Daughter denies significant history of alcohol use. Transaminitis may be explained by potential COVID infection, although it is unclear whether bilirubin elevation is predominantly direct or indirect.  - Follow-up repeat bilirubin and D-bilirubin level. Follow-up haptoglobin and repeat LDH in the setting of anemia. If primarily direct, may consider RUQ US.  - Trend transaminases  - f/u hepatitis panel  - Minimize hepatotoxic agents  - deferred RUQUS for now Patient noted to have transaminitis AST/ALT 77/45, and elevated T-bili 2.6. INR pending. Daughter denies significant history of alcohol use. Transaminitis may be explained by potential COVID infection, although it is unclear whether bilirubin elevation is predominantly direct or indirect.  - Follow-up repeat bilirubin and D-bilirubin level. Follow-up haptoglobin and repeat LDH in the setting of anemia. If primarily direct, may consider RUQ US.  - Trend transaminases  - f/u hepatitis panel  - Minimize hepatotoxic agents  - deferred RUQ US for now

## 2020-04-07 NOTE — PROGRESS NOTE ADULT - PROBLEM SELECTOR PLAN 4
In the setting of suspected COVID infection. Comfortable on 2L NC at this time.  - Monitor respiratory status and treat supportively In the setting of suspected COVID infection. Comfortable on 2L NC at this time.  - Monitor respiratory status and treat supportively  - currently on RA as pt not tolerating NC, satting in low 90s

## 2020-04-07 NOTE — CONSULT NOTE ADULT - SUBJECTIVE AND OBJECTIVE BOX
[facility] DIVISION OF KIDNEY DISEASES AND HYPERTENSION -- INITIAL CONSULT NOTE  --------------------------------------------------------------------------------  HPI:    74M with no significant no PMH and on no medications presented due to generalized weakness, confusion,  and per daughter his mother passed away 3/26 and he has since been depressed, with generalized weakness, decreased appetite, and change in behavior.  Patient's baseline per chart is usually Aa&Ox3 and now he is A&Ox1.  She denied vomiting, diarrhea, pain, fever.      In the ED, he was altered from baseline at A&Ox1, afebrile, tachycardic to 116 bpm, BP (116-130)/(66-78), RR 16-23, SpO2 88% on RA improved to 98% on 2L NC.   Na 167, K 3.8, BUN 21, Cr 1.09, T-bili 2.6, AST/ALT 77/45, ,   D-dimer 454, Fibrinogen 511.    Initial Imaging: CT Head wnl, CXR with patchy opacities in the LLL field and possibly RUL field, consistent w/ possible infection.    He was give 2L NS by his daughter prior to coming to ER, and then started in ER on PO807iz/hr.  This am team changed him to d5W@100cc/hr.  Patient noted to be agitated today, pulling off O2 and also moving hand causing IV to beep and fluids to be administered intermittently.            PAST HISTORY  --------------------------------------------------------------------------------  PAST MEDICAL & SURGICAL HISTORY:  No pertinent past medical history  H/O hernia repair    FAMILY HISTORY:  No pertinent family history in first degree relatives    PAST SOCIAL HISTORY: n/A     ALLERGIES & MEDICATIONS  --------------------------------------------------------------------------------  Allergies    No Known Allergies    Intolerances      Standing Inpatient Medications  dextrose 5%. 1000 milliLiter(s) IV Continuous <Continuous>  enoxaparin Injectable 40 milliGRAM(s) SubCutaneous daily  hydroxychloroquine 400 milliGRAM(s) Oral every 12 hours  hydroxychloroquine   Oral   potassium chloride   Solution 40 milliEquivalent(s) Oral once  potassium phosphate IVPB 15 milliMole(s) IV Intermittent once    PRN Inpatient Medications      REVIEW OF SYSTEMS  --------------------------------------------------------------------------------     per chart,  Review of Systems: A&O x1 As per HPI, daughter noted depressed mood, generalized weakness, confusion, decreased appetite.	      All other systems were reviewed and are negative, except as noted.    VITALS/PHYSICAL EXAM  --------------------------------------------------------------------------------  T(C): 37 (04-07-20 @ 09:23), Max: 37.3 (04-06-20 @ 18:18)  HR: 88 (04-07-20 @ 09:23) (88 - 116)  BP: 113/69 (04-07-20 @ 09:23) (113/69 - 137/73)  RR: 19 (04-07-20 @ 09:23) (16 - 23)  SpO2: 94% (04-07-20 @ 09:23) (88% - 98%)  Wt(kg): --  Height (cm): 170.18 (04-06-20 @ 18:18)  Weight (kg): 70.3 (04-06-20 @ 18:18)  BMI (kg/m2): 24.3 (04-06-20 @ 18:18)  BSA (m2): 1.81 (04-06-20 @ 18:18)      04-06-20 @ 07:01  -  04-07-20 @ 07:00  --------------------------------------------------------  IN: 250 mL / OUT: 450 mL / NET: -200 mL    04-07-20 @ 07:01  -  04-07-20 @ 17:20  --------------------------------------------------------  IN: 260 mL / OUT: 600 mL / NET: -340 mL      Physical Exam:  	 due to covid19 positive and in effort preserve PPE, physical exam was deferred  Physical exam of ER and IM from today used       LABS/STUDIES  --------------------------------------------------------------------------------              11.6   2.45  >-----------<  231      [04-07-20 @ 02:56]              37.5     168  |  135  |  18  ----------------------------<  163      [04-07-20 @ 14:22]  3.2   |  21  |  0.87        Ca     9.4     [04-07-20 @ 14:22]      Mg     2.0     [04-07-20 @ 14:22]      Phos  1.9     [04-07-20 @ 14:22]    TPro  6.3  /  Alb  3.1  /  TBili  2.5  /  DBili  x   /  AST  70  /  ALT  45  /  AlkPhos  70  [04-07-20 @ 14:22]    PT/INR: PT 16.4 , INR 1.41       [04-07-20 @ 02:56]  PTT: 34.2       [04-07-20 @ 02:56]          [04-07-20 @ 02:56]    Creatinine Trend:  SCr 0.87 [04-07 @ 14:22]  SCr 1.02 [04-07 @ 02:56]  SCr 1.09 [04-06 @ 19:08]    Urinalysis - [04-06-20 @ 19:39]      Color Dark Yellow / Appearance Clear / SG 1.014 / pH 6.0      Gluc Negative / Ketone Small  / Bili Small / Urobili 8 mg/dL       Blood Trace / Protein 100 / Leuk Est Negative / Nitrite Negative      RBC 4 / WBC 12 / Hyaline 6 / Gran  / Sq Epi  / Non Sq Epi 2 / Bacteria Negative      Iron 25, TIBC 192, %sat 13      [04-07-20 @ 08:11]  Ferritin 3157      [04-07-20 @ 01:18]  HbA1c 5.6      [04-07-20 @ 07:40]  TSH 0.63      [04-07-20 @ 08:28]    HBsAg Nonreact      [04-07-20 @ 08:17]  HCV 0.33, Nonreact      [04-07-20 @ 08:17]

## 2020-04-08 LAB
ALBUMIN SERPL ELPH-MCNC: 3.1 G/DL — LOW (ref 3.3–5)
ALP SERPL-CCNC: 61 U/L — SIGNIFICANT CHANGE UP (ref 40–120)
ALT FLD-CCNC: 42 U/L — SIGNIFICANT CHANGE UP (ref 10–45)
ANION GAP SERPL CALC-SCNC: 12 MMOL/L — SIGNIFICANT CHANGE UP (ref 5–17)
AST SERPL-CCNC: 63 U/L — HIGH (ref 10–40)
BILIRUB SERPL-MCNC: 2.1 MG/DL — HIGH (ref 0.2–1.2)
BUN SERPL-MCNC: 15 MG/DL — SIGNIFICANT CHANGE UP (ref 7–23)
BUN SERPL-MCNC: 16 MG/DL — SIGNIFICANT CHANGE UP (ref 7–23)
BUN SERPL-MCNC: 17 MG/DL — SIGNIFICANT CHANGE UP (ref 7–23)
CALCIUM SERPL-MCNC: 9.3 MG/DL — SIGNIFICANT CHANGE UP (ref 8.4–10.5)
CALCIUM SERPL-MCNC: 9.4 MG/DL — SIGNIFICANT CHANGE UP (ref 8.4–10.5)
CALCIUM SERPL-MCNC: 9.6 MG/DL — SIGNIFICANT CHANGE UP (ref 8.4–10.5)
CHLORIDE SERPL-SCNC: 125 MMOL/L — HIGH (ref 96–108)
CHLORIDE SERPL-SCNC: 130 MMOL/L — HIGH (ref 96–108)
CHLORIDE SERPL-SCNC: 132 MMOL/L — HIGH (ref 96–108)
CO2 SERPL-SCNC: 24 MMOL/L — SIGNIFICANT CHANGE UP (ref 22–31)
CO2 SERPL-SCNC: 24 MMOL/L — SIGNIFICANT CHANGE UP (ref 22–31)
CO2 SERPL-SCNC: 25 MMOL/L — SIGNIFICANT CHANGE UP (ref 22–31)
CREAT SERPL-MCNC: 0.9 MG/DL — SIGNIFICANT CHANGE UP (ref 0.5–1.3)
CREAT SERPL-MCNC: 0.92 MG/DL — SIGNIFICANT CHANGE UP (ref 0.5–1.3)
CREAT SERPL-MCNC: 0.94 MG/DL — SIGNIFICANT CHANGE UP (ref 0.5–1.3)
GLUCOSE SERPL-MCNC: 110 MG/DL — HIGH (ref 70–99)
GLUCOSE SERPL-MCNC: 146 MG/DL — HIGH (ref 70–99)
GLUCOSE SERPL-MCNC: 178 MG/DL — HIGH (ref 70–99)
HCT VFR BLD CALC: 33.7 % — LOW (ref 39–50)
HGB BLD-MCNC: 10.4 G/DL — LOW (ref 13–17)
MAGNESIUM SERPL-MCNC: 2 MG/DL — SIGNIFICANT CHANGE UP (ref 1.6–2.6)
MAGNESIUM SERPL-MCNC: 2 MG/DL — SIGNIFICANT CHANGE UP (ref 1.6–2.6)
MCHC RBC-ENTMCNC: 30 PG — SIGNIFICANT CHANGE UP (ref 27–34)
MCHC RBC-ENTMCNC: 30.9 GM/DL — LOW (ref 32–36)
MCV RBC AUTO: 97.1 FL — SIGNIFICANT CHANGE UP (ref 80–100)
NRBC # BLD: 0 /100 WBCS — SIGNIFICANT CHANGE UP (ref 0–0)
OSMOLALITY SERPL: 350 MOSMOL/KG — HIGH (ref 280–301)
OSMOLALITY UR: 440 MOSM/KG — SIGNIFICANT CHANGE UP (ref 50–1200)
PHOSPHATE SERPL-MCNC: 2.8 MG/DL — SIGNIFICANT CHANGE UP (ref 2.5–4.5)
PHOSPHATE SERPL-MCNC: 2.8 MG/DL — SIGNIFICANT CHANGE UP (ref 2.5–4.5)
PLATELET # BLD AUTO: 235 K/UL — SIGNIFICANT CHANGE UP (ref 150–400)
POTASSIUM SERPL-MCNC: 3.7 MMOL/L — SIGNIFICANT CHANGE UP (ref 3.5–5.3)
POTASSIUM SERPL-MCNC: 3.9 MMOL/L — SIGNIFICANT CHANGE UP (ref 3.5–5.3)
POTASSIUM SERPL-MCNC: 4 MMOL/L — SIGNIFICANT CHANGE UP (ref 3.5–5.3)
POTASSIUM SERPL-SCNC: 3.7 MMOL/L — SIGNIFICANT CHANGE UP (ref 3.5–5.3)
POTASSIUM SERPL-SCNC: 3.9 MMOL/L — SIGNIFICANT CHANGE UP (ref 3.5–5.3)
POTASSIUM SERPL-SCNC: 4 MMOL/L — SIGNIFICANT CHANGE UP (ref 3.5–5.3)
PROT SERPL-MCNC: 6.4 G/DL — SIGNIFICANT CHANGE UP (ref 6–8.3)
RBC # BLD: 3.47 M/UL — LOW (ref 4.2–5.8)
RBC # FLD: 12.3 % — SIGNIFICANT CHANGE UP (ref 10.3–14.5)
SODIUM SERPL-SCNC: 162 MMOL/L — CRITICAL HIGH (ref 135–145)
SODIUM SERPL-SCNC: 166 MMOL/L — CRITICAL HIGH (ref 135–145)
SODIUM SERPL-SCNC: 168 MMOL/L — CRITICAL HIGH (ref 135–145)
WBC # BLD: 3.06 K/UL — LOW (ref 3.8–10.5)
WBC # FLD AUTO: 3.06 K/UL — LOW (ref 3.8–10.5)

## 2020-04-08 PROCEDURE — 99232 SBSQ HOSP IP/OBS MODERATE 35: CPT

## 2020-04-08 PROCEDURE — 99233 SBSQ HOSP IP/OBS HIGH 50: CPT | Mod: GC

## 2020-04-08 RX ADMIN — Medication 400 MILLIGRAM(S): at 02:30

## 2020-04-08 RX ADMIN — Medication 200 MILLIGRAM(S): at 21:58

## 2020-04-08 RX ADMIN — ENOXAPARIN SODIUM 40 MILLIGRAM(S): 100 INJECTION SUBCUTANEOUS at 12:21

## 2020-04-08 RX ADMIN — Medication 200 MILLIGRAM(S): at 12:21

## 2020-04-08 NOTE — PROGRESS NOTE ADULT - PROBLEM SELECTOR PLAN 7
Discussed GOC with patient's daughter, Julieta.  Patient is full code at this time.  HCPs are patient's wife and daughter [phone]

## 2020-04-08 NOTE — PROGRESS NOTE ADULT - PROBLEM SELECTOR PLAN 5
Patient noted to have transaminitis AST/ALT 77/45, and elevated T-bili 2.6. INR pending. Daughter denies significant history of alcohol use. Transaminitis may be explained by potential COVID infection, although it is unclear whether bilirubin elevation is predominantly direct or indirect.  - Follow-up repeat bilirubin and D-bilirubin level. Follow-up haptoglobin and repeat LDH in the setting of anemia. If primarily direct, may consider RUQ US.  - Trend transaminases  - f/u hepatitis panel  - Minimize hepatotoxic agents  - deferred RUQ US for now

## 2020-04-08 NOTE — PROGRESS NOTE ADULT - PROBLEM SELECTOR PLAN 3
Likely in the setting of suspected dehydration from poor PO over the past 2 weeks. Patient received 2L IV fluid in the field by his daughter (EMT) prior to arrival.  Serum Na 167 despite IV NS repletion. Started on additional IV NS in the ED.  - Dr. Stovall from nephrology consulted - despite being on d5w 100 cc/hr for 12 hours, no improvement in Na - may be 2/2 kinked IV tube, which was replaced.  --started on d5w 150 cc/hr, will monitor midnight bmp and adjust accordingly, goal Na is 158-160 in 24h, will also replace electrolytes PRN  --Na 166 on AM of 4/8, will c/w d52 at 150 cc/hr, re-check at ~2pm today and adjust accordingly or continue Likely in the setting of suspected dehydration from poor PO over the past 2 weeks. Patient received 2L IV fluid in the field by his daughter (EMT) prior to arrival.  Serum Na 167 despite IV NS repletion. Started on additional IV NS in the ED.  - Dr. Stovall from nephrology consulted - despite being on d5w 100 cc/hr for 12 hours, no improvement in Na - may be 2/2 kinked IV tube, which was replaced.  --started on d5w 150 cc/hr, will monitor midnight bmp and adjust accordingly, goal Na is 158-160 in 24h, will also replace electrolytes PRN  --Na 166 on AM of 4/8, per nephrology consider increasing to 175 cc/hr, f/u CMP and adjust rate accordingly (goal drop of 8 mEq Na in 24h)

## 2020-04-08 NOTE — PROGRESS NOTE ADULT - PROBLEM SELECTOR PLAN 4
In the setting of suspected COVID infection. Comfortable on 2L NC at this time.  - Monitor respiratory status and treat supportively  - 4/7 on RA as pt not tolerating NC, satting in low 90s

## 2020-04-08 NOTE — PROGRESS NOTE ADULT - ASSESSMENT
74M no PMH (on no medications) who presented to the ED with generalized weakness and confusion in the setting of poor PO and depressed mood, found to be hypernatremic and hypoxic with COVID infection.

## 2020-04-08 NOTE — PROGRESS NOTE ADULT - ATTENDING COMMENTS
74M no PMH (on no medications) who presented to the ED with generalized weakness and confusion in the setting of poor PO and depressed mood, found to be hypernatremic and hypoxic with COVID infection.  - Wean O2 as tolerated. Continue monitoring.   - Continue with Plaquenil  - Nephro consult for Hypernatremia, monitor electrolytes closely. Started on d5w 150 cc/hr. Will follow-up with BMP. Goal Na is 158-160 in 24h.  - DVT prop

## 2020-04-08 NOTE — PROGRESS NOTE ADULT - PROBLEM SELECTOR PLAN 1
- COVID19 PCR positive  - Chest imaging consistent with multifocal pneumonia  - Continue with supplemental oxygen with goal SpO2>92, if requires escalate to NRB and avoid BiLevel/High Flow Nasal Cannula per institution policy given risk of aerosolization  - started Plaquenil 4/7-4/12  - Holding abx at this time  - f/u cbc with diff, CMP w/ lytes, coag, D-dimer, procalcitonin, CRP, LDH, Ferritin, Lactate, G6PD  - monitor EKG for QTc prolongation  - isolation precautions

## 2020-04-08 NOTE — PROGRESS NOTE ADULT - ASSESSMENT
74 year old male presented with change in mental status, noted to be hypernatremic and covid19 positive     #Hypernatremia-  chronic (ie>48hours) based on history  -likely due to volume depletion/decreased po hydration and intake  -per team pt with no vomiting or diarrhea  - okay to continue d5w but increase rate to 175cc/hr  -check sodium levels q6-8hr stat to make sure it is not increasing or decreasing too rapidly  -goal decrease approx not more than 8-10meq in next 24hours  -glucose control to avoid osmotic diuresis with resultant hypernatremia     #hypokalemia- replete as needed    #hypophosphatemia- replete as needed    #covid19 +- treatment per medicial team 74 year old male presented with change in mental status, noted to be hypernatremic and covid19 positive     #Hypernatremia-  chronic (ie>48hours) based on history  -likely due to volume depletion/decreased po hydration and intake  -per team pt with no vomiting or diarrhea  - okay to continue d5w but increase rate to 175cc/hr if sodium is not downtrending  -check sodium levels q6-8hr stat to make sure it is not increasing or decreasing too rapidly  -goal decrease approx not more than 8-10meq in next 24hours  -glucose control to avoid osmotic diuresis with resultant hypernatremia     #hypokalemia- replete as needed    #hypophosphatemia- replete as needed    #covid19 +- treatment per medicial team

## 2020-04-08 NOTE — PROGRESS NOTE ADULT - SUBJECTIVE AND OBJECTIVE BOX
[facility] Division of Internal Medicine  Ingrid Sepulveda, PGY-1  Pager: [facility]: [phone] | [facility]: [phone]    Patient is a 74y old  Male who presents with a chief complaint of Suspected COVID, hypoxia, AMS, Hypernatremia (2020 17:20)      SUBJECTIVE / OVERNIGHT EVENTS: No acute events overnight. Na remained 168 overnight    ADDITIONAL REVIEW OF SYSTEMS: No CP, SOB, fever, chills, nausea, vomiting, diarrhea, abdominal pain.     MEDICATIONS  (STANDING):  dextrose 5%. 1000 milliLiter(s) (150 mL/Hr) IV Continuous <Continuous>  enoxaparin Injectable 40 milliGRAM(s) SubCutaneous daily  hydroxychloroquine   Oral   hydroxychloroquine 200 milliGRAM(s) Oral every 12 hours    MEDICATIONS  (PRN):      CAPILLARY BLOOD GLUCOSE        I&O's Summary    2020 07:01  -  2020 07:00  --------------------------------------------------------  IN: 1760 mL / OUT: 1450 mL / NET: 310 mL        PHYSICAL EXAM:  Vital Signs Last 24 Hrs  T(C): 36.9 (2020 08:30), Max: 37.1 (2020 19:57)  T(F): 98.5 (2020 08:30), Max: 98.7 (2020 19:57)  HR: 72 (2020 08:30) (72 - 97)  BP: 102/66 (2020 08:30) (102/66 - 122/74)  BP(mean): --  RR: 20 (2020 08:30) (19 - 20)  SpO2: 95% (2020 08:30) (94% - 100%)  CONSTITUTIONAL: NAD  EYES: conjunctiva and sclera clear  ENMT: Moist oral mucosa  RESPIRATORY: Normal respiratory effort; lungs are clear to auscultation bilaterally  CARDIOVASCULAR: Regular rate and rhythm, normal S1 and S2, no murmur/rub/gallop;  MUSCULOSKELETAL: no clubbing or cyanosis of digits; no joint swelling or tenderness to palpation  PSYCH: depressed mood, AAOx0  NEUROLOGY: CN 2-12 are grossly intact and symmetric; no gross sensory deficits   LABS:                        10.4   3.06  )-----------( 235      ( 2020 06:58 )             33.7     04-08    166<HH>  |  130<H>  |  16  ----------------------------<  146<H>  3.9   |  24  |  0.92    Ca    9.4      2020 06:58  Phos  2.8     04-08  Mg     2.0     04-08    TPro  6.4  /  Alb  3.1<L>  /  TBili  2.1<H>  /  DBili  x   /  AST  63<H>  /  ALT  42  /  AlkPhos  61  04-08    PT/INR - ( 2020 02:56 )   PT: 16.4 sec;   INR: 1.41 ratio         PTT - ( 2020 02:56 )  PTT:34.2 sec      Urinalysis Basic - ( 2020 19:39 )    Color: Dark Yellow / Appearance: Clear / S.014 / pH: x  Gluc: x / Ketone: Small  / Bili: Small / Urobili: 8 mg/dL   Blood: x / Protein: 100 / Nitrite: Negative   Leuk Esterase: Negative / RBC: 4 /hpf / WBC 12 /HPF   Sq Epi: x / Non Sq Epi: 2 /hpf / Bacteria: Negative        Culture - Blood (collected 2020 00:32)  Source: .Blood Blood-Peripheral  Preliminary Report (2020 01:02):    No growth to date.    Culture - Blood (collected 2020 23:17)  Source: .Blood Blood-Peripheral  Preliminary Report (2020 01:02):    No growth to date.    Culture - Urine (collected 2020 23:07)  Source: .Urine Clean Catch (Midstream)  Final Report (2020 19:46):    No growth        RADIOLOGY & ADDITIONAL TESTS:  Results Reviewed:   Imaging Personally Reviewed:  Electrocardiogram Personally Reviewed:    COORDINATION OF CARE:  Care Discussed with Consultants/Other Providers [Y/N]:  Prior or Outpatient Records Reviewed [Y/N]:

## 2020-04-08 NOTE — PROGRESS NOTE ADULT - SUBJECTIVE AND OBJECTIVE BOX
[facility] DIVISION OF KIDNEY DISEASES AND HYPERTENSION -- FOLLOW UP NOTE  --------------------------------------------------------------------------------  Chief Complaint:/subjective: no acute events noted    24 hour events:as above        PAST HISTORY  --------------------------------------------------------------------------------  No significant changes to PMH, PSH, FHx, SHx, unless otherwise noted    ALLERGIES & MEDICATIONS  --------------------------------------------------------------------------------  Allergies    No Known Allergies    Intolerances      Standing Inpatient Medications  dextrose 5%. 1000 milliLiter(s) IV Continuous <Continuous>  enoxaparin Injectable 40 milliGRAM(s) SubCutaneous daily  hydroxychloroquine   Oral   hydroxychloroquine 200 milliGRAM(s) Oral every 12 hours    PRN Inpatient Medications      REVIEW OF SYSTEMS  --------------------------------------------------------------------------------       All other systems were reviewed and are negative, except as noted per IM note    VITALS/PHYSICAL EXAM  --------------------------------------------------------------------------------  T(C): 36.9 (04-08-20 @ 08:30), Max: 37.1 (04-07-20 @ 19:57)  HR: 89 (04-08-20 @ 10:36) (72 - 97)  BP: 102/66 (04-08-20 @ 08:30) (102/66 - 122/74)  RR: 20 (04-08-20 @ 08:30) (20 - 20)  SpO2: 95% (04-08-20 @ 10:36) (94% - 100%)  Wt(kg): --  Adult Advanced Hemodynamics Last 24 Hrs  ABP: --  ABP(mean): --  CVP(mm Hg): --  CO: --  CI: --  PA: --  PA(mean): --  PCWP: --  SVR: --  SVRI: --  Height (cm): 170.18 (04-06-20 @ 18:18)  Weight (kg): 70.3 (04-06-20 @ 18:18)  BMI (kg/m2): 24.3 (04-06-20 @ 18:18)  BSA (m2): 1.81 (04-06-20 @ 18:18)      04-07-20 @ 07:01  -  04-08-20 @ 07:00  --------------------------------------------------------  IN: 1760 mL / OUT: 1450 mL / NET: 310 mL      Physical Exam:  	deferred due to covid positive and in effort to preserve PPE  used IM physical exam for today    LABS/STUDIES  --------------------------------------------------------------------------------              10.4   3.06  >-----------<  235      [04-08-20 @ 06:58]              33.7     Hemoglobin: 10.4 g/dL (04-08-20 @ 06:58)  Hemoglobin: 11.6 g/dL (04-07-20 @ 02:56)    Platelet Count - Automated: 235 K/uL (04-08-20 @ 06:58)  Platelet Count - Automated: 231 K/uL (04-07-20 @ 02:56)    166  |  130  |  16  ----------------------------<  146      [04-08-20 @ 06:58]  3.9   |  24  |  0.92        Ca     9.4     [04-08-20 @ 06:58]      Mg     2.0     [04-08-20 @ 06:58]      Phos  2.8     [04-08-20 @ 06:58]    TPro  6.4  /  Alb  3.1  /  TBili  2.1  /  DBili  x   /  AST  63  /  ALT  42  /  AlkPhos  61  [04-08-20 @ 06:58]    PT/INR: PT 16.4 , INR 1.41       [04-07-20 @ 02:56]  PTT: 34.2       [04-07-20 @ 02:56]          [04-07-20 @ 02:56]  Serum Osmolality 350      [04-08-20 @ 00:41]    Creatinine Trend:  SCr 0.92 [04-08 @ 06:58]  SCr 0.94 [04-07 @ 22:57]  SCr 0.87 [04-07 @ 14:22]  SCr 1.02 [04-07 @ 02:56]  SCr 1.09 [04-06 @ 19:08]    Urinalysis - [04-06-20 @ 19:39]      Color Dark Yellow / Appearance Clear / SG 1.014 / pH 6.0      Gluc Negative / Ketone Small  / Bili Small / Urobili 8 mg/dL       Blood Trace / Protein 100 / Leuk Est Negative / Nitrite Negative      RBC 4 / WBC 12 / Hyaline 6 / Gran  / Sq Epi  / Non Sq Epi 2 / Bacteria Negative    Urine Osmolality 440      [04-08-20 @ 04:21]    Iron 25, TIBC 192, %sat 13      [04-07-20 @ 08:11]  Ferritin 3157      [04-07-20 @ 01:18]  HbA1c 5.6      [04-07-20 @ 07:40]  TSH 0.63      [04-07-20 @ 08:28]    HBsAg Nonreact      [04-07-20 @ 08:17]  HCV 0.33, Nonreact      [04-07-20 @ 08:17]

## 2020-04-09 DIAGNOSIS — J96.01 ACUTE RESPIRATORY FAILURE WITH HYPOXIA: ICD-10-CM

## 2020-04-09 DIAGNOSIS — Z02.9 ENCOUNTER FOR ADMINISTRATIVE EXAMINATIONS, UNSPECIFIED: ICD-10-CM

## 2020-04-09 LAB
ALBUMIN SERPL ELPH-MCNC: 2.8 G/DL — LOW (ref 3.3–5)
ALP SERPL-CCNC: 72 U/L — SIGNIFICANT CHANGE UP (ref 40–120)
ALT FLD-CCNC: 39 U/L — SIGNIFICANT CHANGE UP (ref 10–45)
ANION GAP SERPL CALC-SCNC: 11 MMOL/L — SIGNIFICANT CHANGE UP (ref 5–17)
ANION GAP SERPL CALC-SCNC: 12 MMOL/L — SIGNIFICANT CHANGE UP (ref 5–17)
ANION GAP SERPL CALC-SCNC: 9 MMOL/L — SIGNIFICANT CHANGE UP (ref 5–17)
AST SERPL-CCNC: 46 U/L — HIGH (ref 10–40)
BILIRUB SERPL-MCNC: 1.6 MG/DL — HIGH (ref 0.2–1.2)
BUN SERPL-MCNC: 18 MG/DL — SIGNIFICANT CHANGE UP (ref 7–23)
CALCIUM SERPL-MCNC: 9.3 MG/DL — SIGNIFICANT CHANGE UP (ref 8.4–10.5)
CALCIUM SERPL-MCNC: 9.4 MG/DL — SIGNIFICANT CHANGE UP (ref 8.4–10.5)
CALCIUM SERPL-MCNC: 9.4 MG/DL — SIGNIFICANT CHANGE UP (ref 8.4–10.5)
CHLORIDE SERPL-SCNC: 121 MMOL/L — HIGH (ref 96–108)
CHLORIDE SERPL-SCNC: 127 MMOL/L — HIGH (ref 96–108)
CHLORIDE SERPL-SCNC: 128 MMOL/L — HIGH (ref 96–108)
CO2 SERPL-SCNC: 25 MMOL/L — SIGNIFICANT CHANGE UP (ref 22–31)
CO2 SERPL-SCNC: 26 MMOL/L — SIGNIFICANT CHANGE UP (ref 22–31)
CO2 SERPL-SCNC: 27 MMOL/L — SIGNIFICANT CHANGE UP (ref 22–31)
CREAT SERPL-MCNC: 0.89 MG/DL — SIGNIFICANT CHANGE UP (ref 0.5–1.3)
CREAT SERPL-MCNC: 0.91 MG/DL — SIGNIFICANT CHANGE UP (ref 0.5–1.3)
CREAT SERPL-MCNC: 0.94 MG/DL — SIGNIFICANT CHANGE UP (ref 0.5–1.3)
CRP SERPL-MCNC: 4.57 MG/DL — HIGH (ref 0–0.4)
D DIMER BLD IA.RAPID-MCNC: 269 NG/ML DDU — HIGH
FERRITIN SERPL-MCNC: 2127 NG/ML — HIGH (ref 30–400)
G6PD RBC-CCNC: 1.7 U/G HGB — LOW (ref 7–20.5)
GLUCOSE SERPL-MCNC: 136 MG/DL — HIGH (ref 70–99)
GLUCOSE SERPL-MCNC: 165 MG/DL — HIGH (ref 70–99)
GLUCOSE SERPL-MCNC: 196 MG/DL — HIGH (ref 70–99)
HCT VFR BLD CALC: 33.8 % — LOW (ref 39–50)
HGB BLD-MCNC: 10.4 G/DL — LOW (ref 13–17)
MAGNESIUM SERPL-MCNC: 2 MG/DL — SIGNIFICANT CHANGE UP (ref 1.6–2.6)
MAGNESIUM SERPL-MCNC: 2.1 MG/DL — SIGNIFICANT CHANGE UP (ref 1.6–2.6)
MCHC RBC-ENTMCNC: 29.8 PG — SIGNIFICANT CHANGE UP (ref 27–34)
MCHC RBC-ENTMCNC: 30.8 GM/DL — LOW (ref 32–36)
MCV RBC AUTO: 96.8 FL — SIGNIFICANT CHANGE UP (ref 80–100)
NRBC # BLD: 0 /100 WBCS — SIGNIFICANT CHANGE UP (ref 0–0)
PHOSPHATE SERPL-MCNC: 2.6 MG/DL — SIGNIFICANT CHANGE UP (ref 2.5–4.5)
PHOSPHATE SERPL-MCNC: 2.7 MG/DL — SIGNIFICANT CHANGE UP (ref 2.5–4.5)
PLATELET # BLD AUTO: 276 K/UL — SIGNIFICANT CHANGE UP (ref 150–400)
POTASSIUM SERPL-MCNC: 3.7 MMOL/L — SIGNIFICANT CHANGE UP (ref 3.5–5.3)
POTASSIUM SERPL-SCNC: 3.7 MMOL/L — SIGNIFICANT CHANGE UP (ref 3.5–5.3)
PROCALCITONIN SERPL-MCNC: 0.12 NG/ML — HIGH (ref 0.02–0.1)
PROT SERPL-MCNC: 6.3 G/DL — SIGNIFICANT CHANGE UP (ref 6–8.3)
RBC # BLD: 3.49 M/UL — LOW (ref 4.2–5.8)
RBC # FLD: 12.3 % — SIGNIFICANT CHANGE UP (ref 10.3–14.5)
SODIUM SERPL-SCNC: 158 MMOL/L — HIGH (ref 135–145)
SODIUM SERPL-SCNC: 163 MMOL/L — CRITICAL HIGH (ref 135–145)
SODIUM SERPL-SCNC: 165 MMOL/L — CRITICAL HIGH (ref 135–145)
WBC # BLD: 3.63 K/UL — LOW (ref 3.8–10.5)
WBC # FLD AUTO: 3.63 K/UL — LOW (ref 3.8–10.5)

## 2020-04-09 PROCEDURE — 99233 SBSQ HOSP IP/OBS HIGH 50: CPT

## 2020-04-09 PROCEDURE — 99232 SBSQ HOSP IP/OBS MODERATE 35: CPT | Mod: GC

## 2020-04-09 RX ORDER — SODIUM CHLORIDE 9 MG/ML
1000 INJECTION, SOLUTION INTRAVENOUS
Refills: 0 | Status: DISCONTINUED | OUTPATIENT
Start: 2020-04-09 | End: 2020-04-09

## 2020-04-09 RX ADMIN — Medication 200 MILLIGRAM(S): at 12:08

## 2020-04-09 RX ADMIN — SODIUM CHLORIDE 150 MILLILITER(S): 9 INJECTION, SOLUTION INTRAVENOUS at 02:48

## 2020-04-09 RX ADMIN — SODIUM CHLORIDE 175 MILLILITER(S): 9 INJECTION, SOLUTION INTRAVENOUS at 14:08

## 2020-04-09 RX ADMIN — Medication 200 MILLIGRAM(S): at 22:23

## 2020-04-09 RX ADMIN — ENOXAPARIN SODIUM 40 MILLIGRAM(S): 100 INJECTION SUBCUTANEOUS at 12:08

## 2020-04-09 NOTE — PROGRESS NOTE ADULT - PROBLEM SELECTOR PLAN 6
SQ Lovenox  Trend renal function - DVT PPE: Lovenox  - Diet: Regular diet  - Dispo: Home with home PT (per PT recs 4/9)  - Code status: FULL CODE - DVT PPE: Lovenox  - Diet: DASH/TLC diet to limit hypernatremia  - Dispo: Home with home PT (per PT recs 4/9)  - Code status: FULL CODE

## 2020-04-09 NOTE — PROGRESS NOTE ADULT - ASSESSMENT
74 year old male presented with change in mental status, noted to be hypernatremic and covid19 positive     #Hypernatremia-  chronic (ie>48hours) based on history  -likely due to volume depletion/decreased po hydration and intake  -per team pt with no vomiting or diarrhea  - okay to continue d5w but increase rate to 175cc/hr   -check sodium levels q 8hr stat to make sure it is not increasing or decreasing too rapidly   -glucose control to avoid osmotic diuresis with resultant hypernatremia     #hypokalemia- replete as needed    #hypophosphatemia- replete as needed    #covid19 +- treatment per medicial team

## 2020-04-09 NOTE — PROGRESS NOTE ADULT - PROBLEM SELECTOR PLAN 7
Discussed GOC with patient's daughter, Julieta.  Patient is full code at this time.  HCPs are patient's wife and daughter [phone] - Discussed GOC with patient's daughterJulieta  - Patient is full code at this time  - HCPs are patient's wife and daughter [phone]

## 2020-04-09 NOTE — PROGRESS NOTE ADULT - PROBLEM SELECTOR PLAN 3
Likely in the setting of suspected dehydration from poor PO over the past 2 weeks. Patient received 2L IV fluid in the field by his daughter (EMT) prior to arrival.  Serum Na 167 despite IV NS repletion. Started on additional IV NS in the ED.  - Dr. Stovall from nephrology consulted - despite being on d5w 100 cc/hr for 12 hours, no improvement in Na - may be 2/2 kinked IV tube, which was replaced.  --started on d5w 150 cc/hr, will monitor midnight bmp and adjust accordingly, goal Na is 158-160 in 24h, will also replace electrolytes PRN  --Na 166 on AM of 4/8, per nephrology consider increasing to 175 cc/hr, f/u CMP and adjust rate accordingly (goal drop of 8 mEq Na in 24h) - Stable on RA satting 95+    Interval events:  - On presentation, hypoxic to 88 on RA, given NC

## 2020-04-09 NOTE — PROGRESS NOTE ADULT - SUBJECTIVE AND OBJECTIVE BOX
Contact Information:  Jaxon Costa II, MD, MPH  PGY-1, Internal Medicine  Pager: [phone] ([facility]) /// [phone] ([facility])    AJ MORILLO, MRN-[id number]    Patient is a 74y old  Male who presents with a chief complaint of Suspected COVID, hypoxia, AMS, Hypernatremia (08 Apr 2020 11:33)      OVERNIGHT EVENTS:    SUBJECTIVE:    CONSTITUTIONAL: No weakness. No fatigue. No fever.  HEAD: No head trauma.   EYES: No vision changes.  ENT: No hearing changes or tinnitus. No ear pain. No changes in smell. No nasal congestion or discharge. No sore throat. No voice hoarseness.   NECK: No neck pain or stiffness. No lumps.  RESPIRATORY: No cough. No SOB. No wheezing. No hemoptysis.   CARDIOVASCULAR: No chest pain. No palpitations.   GASTROINTESTINAL: No dysphagia. No ABD pain. No distension. No constipation. No diarrhea. No pain with defecation. No hematemesis. No hematochezia or melena.  BACK: No back pain.  GENITOURINARY: No dysuria. No frequency or urgency. No hesitancy. No incontinence. No urinary retention. No suprapubic pain. No hematuria.  EXTREMITY: No swelling.  MUSCULOSKELETAL: No joint pain or swelling. No fractures. No stiffness.    SKIN: No rashes. No itching. No skin, hair, or nail changes.  NEUROLOGICAL: No weakness or paralysis. No lightheadedness or dizziness. No HA. No numbness or tingling.   PSYCHIATRIC: No depression.       OBJECTIVE:  Vital Signs Last 24 Hrs  T(C): 36.8 (09 Apr 2020 05:47), Max: 36.9 (08 Apr 2020 08:30)  T(F): 98.2 (09 Apr 2020 05:47), Max: 98.5 (08 Apr 2020 08:30)  HR: 87 (09 Apr 2020 05:47) (72 - 94)  BP: 112/68 (09 Apr 2020 05:47) (102/66 - 112/70)  BP(mean): --  RR: 18 (09 Apr 2020 05:47) (18 - 20)  SpO2: 95% (09 Apr 2020 05:47) (95% - 95%)  I&O's Summary    07 Apr 2020 07:01  -  08 Apr 2020 07:00  --------------------------------------------------------  IN: 1760 mL / OUT: 1450 mL / NET: 310 mL    08 Apr 2020 07:01  -  09 Apr 2020 06:56  --------------------------------------------------------  IN: 1350 mL / OUT: 1600 mL / NET: -250 mL        MEDICATIONS  (STANDING):  dextrose 5%. 1000 milliLiter(s) (150 mL/Hr) IV Continuous <Continuous>  enoxaparin Injectable 40 milliGRAM(s) SubCutaneous daily  hydroxychloroquine   Oral   hydroxychloroquine 200 milliGRAM(s) Oral every 12 hours    MEDICATIONS  (PRN):    Allergies    No Known Allergies    Intolerances        CONSTITUTIONAL: No acute distress. Awake and alert.  HEAD: No evidence of trauma. Structures WNL.  EYES: +PERRL. +EOMI. No scleral icterus. No conjunctival injection.  ENT: Moist oral mucosa. No erythema. No pharyngeal exudates.   NECK: Supple. Appropriate ROM. No stiffness. No masses or lymphadenopathy.  RESPIRATORY: CTAB. No wheezes, rales, or rhonchi. No accessory muscle use. No apparent respiratory distress.  CARDIOVASCULAR: +S1/S2. No audible S3/S4. Regular rate and rhythm. No murmurs, rubs, or gallops. 2+ radial pulses x b/l UE; 2+ DP pulses x b/l LE.   GASTROINTESTINAL: Soft, nontender, nondistended. +BS. No rebound or guarding.   BACK: No spinal or paraspinal tenderness. No CVA tenderness.  EXTREMITY: No LE swelling or edema. EXTs warm to touch.  MUSCULOSKELETAL: Spontaneous movement in all extremities.  DERMATOLOGICAL: No abnormal rashes or lesions.  NEUROLOGICAL: CN 2-12 grossly intact. No focal deficits. Sensation intact x 4EXT. A&Ox3 (oriented to person, place, and time).  PSYCHIATRIC: Appropriate affect.                            10.4   3.06  )-----------( 235      ( 08 Apr 2020 06:58 )             33.7       04-09    165<HH>  |  128<H>  |  18  ----------------------------<  136<H>  3.7   |  25  |  0.91    Ca    9.4      09 Apr 2020 01:11  Phos  2.7     04-09  Mg     2.0     04-09    TPro  6.4  /  Alb  3.1<L>  /  TBili  2.1<H>  /  DBili  x   /  AST  63<H>  /  ALT  42  /  AlkPhos  61  04-08    CAPILLARY BLOOD GLUCOSE        LIVER FUNCTIONS - ( 08 Apr 2020 06:58 )  Alb: 3.1 g/dL / Pro: 6.4 g/dL / ALK PHOS: 61 U/L / ALT: 42 U/L / AST: 63 U/L / GGT: x                   Culture - Blood (collected 07 Apr 2020 00:32)  Source: .Blood Blood-Peripheral  Preliminary Report (08 Apr 2020 01:02):    No growth to date.    Culture - Blood (collected 06 Apr 2020 23:17)  Source: .Blood Blood-Peripheral  Preliminary Report (08 Apr 2020 01:02):    No growth to date.    Culture - Urine (collected 06 Apr 2020 23:07)  Source: .Urine Clean Catch (Midstream)  Final Report (07 Apr 2020 19:46):    No growth          RADIOLOGY AND ADDITIONAL TESTS:    CONSULTANT NOTES REVIEWED:    CARE DISCUSSED WITH THE FOLLOWING CONSULTANTS/PROVIDERS: Contact Information:  Jaxon Costa II, MD, MPH  PGY-1, Internal Medicine  Pager: [phone] ([facility]) /// [phone] ([facility])    AJ MORILLO, MRN-[id number]    Patient is a 74y old  Male who presents with a chief complaint of Suspected COVID, hypoxia, AMS, Hypernatremia (08 Apr 2020 11:33)      OVERNIGHT EVENTS: Na 165. Fluids restarted.    SUBJECTIVE: Patient evaluated at bedside, states that he has no acute complaints and wants to go home. He denies SOB, lightheadedness, dizziness, fever, ABD pain/CP, N/V.    ROS negative.      OBJECTIVE:  Vital Signs Last 24 Hrs  T(C): 36.8 (09 Apr 2020 05:47), Max: 36.9 (08 Apr 2020 08:30)  T(F): 98.2 (09 Apr 2020 05:47), Max: 98.5 (08 Apr 2020 08:30)  HR: 87 (09 Apr 2020 05:47) (72 - 94)  BP: 112/68 (09 Apr 2020 05:47) (102/66 - 112/70)  BP(mean): --  RR: 18 (09 Apr 2020 05:47) (18 - 20)  SpO2: 95% (09 Apr 2020 05:47) (95% - 95%)  I&O's Summary    07 Apr 2020 07:01  -  08 Apr 2020 07:00  --------------------------------------------------------  IN: 1760 mL / OUT: 1450 mL / NET: 310 mL    08 Apr 2020 07:01  -  09 Apr 2020 06:56  --------------------------------------------------------  IN: 1350 mL / OUT: 1600 mL / NET: -250 mL        MEDICATIONS  (STANDING):  dextrose 5%. 1000 milliLiter(s) (150 mL/Hr) IV Continuous <Continuous>  enoxaparin Injectable 40 milliGRAM(s) SubCutaneous daily  hydroxychloroquine   Oral   hydroxychloroquine 200 milliGRAM(s) Oral every 12 hours    MEDICATIONS  (PRN):    Allergies    No Known Allergies    Intolerances        CONSTITUTIONAL: Thin and sad-appearing older gentleman. No acute distress.  EYES: No scleral icterus. No conjunctival injection.  ENT: Slightly dry oral mucosa.   RESPIRATORY: CTAB. No wheezes, rales, or rhonchi. No accessory muscle use. No apparent respiratory distress.  CARDIOVASCULAR: +S1/S2. No audible S3/S4. Regular rate and rhythm. No murmurs, rubs, or gallops.    GASTROINTESTINAL: Soft, nontender, nondistended. +BS. No rebound or guarding.   EXTREMITY: No LE swelling or edema. EXTs warm to touch.  MUSCULOSKELETAL: Spontaneous movement in all extremities.  DERMATOLOGICAL: No abnormal rashes or lesions.  NEUROLOGICAL: Nonfocal.  PSYCHIATRIC: Appears slightly depressed.                          10.4   3.06  )-----------( 235      ( 08 Apr 2020 06:58 )             33.7       04-09    165<HH>  |  128<H>  |  18  ----------------------------<  136<H>  3.7   |  25  |  0.91    Ca    9.4      09 Apr 2020 01:11  Phos  2.7     04-09  Mg     2.0     04-09    TPro  6.4  /  Alb  3.1<L>  /  TBili  2.1<H>  /  DBili  x   /  AST  63<H>  /  ALT  42  /  AlkPhos  61  04-08    CAPILLARY BLOOD GLUCOSE        LIVER FUNCTIONS - ( 08 Apr 2020 06:58 )  Alb: 3.1 g/dL / Pro: 6.4 g/dL / ALK PHOS: 61 U/L / ALT: 42 U/L / AST: 63 U/L / GGT: x                   Culture - Blood (collected 07 Apr 2020 00:32)  Source: .Blood Blood-Peripheral  Preliminary Report (08 Apr 2020 01:02):    No growth to date.    Culture - Blood (collected 06 Apr 2020 23:17)  Source: .Blood Blood-Peripheral  Preliminary Report (08 Apr 2020 01:02):    No growth to date.    Culture - Urine (collected 06 Apr 2020 23:07)  Source: .Urine Clean Catch (Midstream)  Final Report (07 Apr 2020 19:46):    No growth          RADIOLOGY AND ADDITIONAL TESTS:    CONSULTANT NOTES REVIEWED:    CARE DISCUSSED WITH THE FOLLOWING CONSULTANTS/PROVIDERS: Contact Information:  Jaxon Costa II, MD, MPH  PGY-1, Internal Medicine  Pager: [phone] ([facility]) /// [phone] ([facility])    AJ MORILLO, MRN-[id number]    Patient is a 74y old  Male who presents with a chief complaint of Suspected COVID, hypoxia, AMS, Hypernatremia (08 Apr 2020 11:33)      OVERNIGHT EVENTS: Na 165. D5W restarted.    SUBJECTIVE: Patient evaluated at bedside, states that he has no acute complaints and wants to go home. He denies SOB, lightheadedness, dizziness, fever, ABD pain/CP, N/V.    ROS negative.      OBJECTIVE:  Vital Signs Last 24 Hrs  T(C): 36.8 (09 Apr 2020 05:47), Max: 36.9 (08 Apr 2020 08:30)  T(F): 98.2 (09 Apr 2020 05:47), Max: 98.5 (08 Apr 2020 08:30)  HR: 87 (09 Apr 2020 05:47) (72 - 94)  BP: 112/68 (09 Apr 2020 05:47) (102/66 - 112/70)  BP(mean): --  RR: 18 (09 Apr 2020 05:47) (18 - 20)  SpO2: 95% (09 Apr 2020 05:47) (95% - 95%)  I&O's Summary    07 Apr 2020 07:01  -  08 Apr 2020 07:00  --------------------------------------------------------  IN: 1760 mL / OUT: 1450 mL / NET: 310 mL    08 Apr 2020 07:01  -  09 Apr 2020 06:56  --------------------------------------------------------  IN: 1350 mL / OUT: 1600 mL / NET: -250 mL        MEDICATIONS  (STANDING):  dextrose 5%. 1000 milliLiter(s) (150 mL/Hr) IV Continuous <Continuous>  enoxaparin Injectable 40 milliGRAM(s) SubCutaneous daily  hydroxychloroquine   Oral   hydroxychloroquine 200 milliGRAM(s) Oral every 12 hours    MEDICATIONS  (PRN):    Allergies    No Known Allergies    Intolerances        CONSTITUTIONAL: Thin and sad-appearing older gentleman. No acute distress.  EYES: No scleral icterus. No conjunctival injection.  ENT: Slightly dry oral mucosa.   RESPIRATORY: CTAB. No wheezes, rales, or rhonchi. No accessory muscle use. No apparent respiratory distress.  CARDIOVASCULAR: +S1/S2. No audible S3/S4. Regular rate and rhythm. No murmurs, rubs, or gallops.    GASTROINTESTINAL: Soft, nontender, nondistended. +BS. No rebound or guarding.   EXTREMITY: No LE swelling or edema. EXTs warm to touch.  MUSCULOSKELETAL: Spontaneous movement in all extremities.  DERMATOLOGICAL: No abnormal rashes or lesions.  NEUROLOGICAL: Nonfocal.  PSYCHIATRIC: Appears slightly depressed.                          10.4   3.06  )-----------( 235      ( 08 Apr 2020 06:58 )             33.7       04-09    165<HH>  |  128<H>  |  18  ----------------------------<  136<H>  3.7   |  25  |  0.91    Ca    9.4      09 Apr 2020 01:11  Phos  2.7     04-09  Mg     2.0     04-09    TPro  6.4  /  Alb  3.1<L>  /  TBili  2.1<H>  /  DBili  x   /  AST  63<H>  /  ALT  42  /  AlkPhos  61  04-08    CAPILLARY BLOOD GLUCOSE        LIVER FUNCTIONS - ( 08 Apr 2020 06:58 )  Alb: 3.1 g/dL / Pro: 6.4 g/dL / ALK PHOS: 61 U/L / ALT: 42 U/L / AST: 63 U/L / GGT: x                   Culture - Blood (collected 07 Apr 2020 00:32)  Source: .Blood Blood-Peripheral  Preliminary Report (08 Apr 2020 01:02):    No growth to date.    Culture - Blood (collected 06 Apr 2020 23:17)  Source: .Blood Blood-Peripheral  Preliminary Report (08 Apr 2020 01:02):    No growth to date.    Culture - Urine (collected 06 Apr 2020 23:07)  Source: .Urine Clean Catch (Midstream)  Final Report (07 Apr 2020 19:46):    No growth          RADIOLOGY AND ADDITIONAL TESTS:    CONSULTANT NOTES REVIEWED:    CARE DISCUSSED WITH THE FOLLOWING CONSULTANTS/PROVIDERS:

## 2020-04-09 NOTE — PROGRESS NOTE ADULT - PROBLEM SELECTOR PLAN 2
In the setting of depression s/p passing of his mother, with very poor PO, now found to be severely hypernatremic with hypoxia due to suspected COVID infection. Non-focal and CTH negative.  - Supportive correction of metabolic and infectious etiology  - Monitor for acute changes in mental status. - C/w plaquenil (4/7 - )  - Isolation precautions    Interval events:  - Presented with weakness, AMS  - Chest imaging consistent with multifocal pneumonia  - COVID19 PCR positive

## 2020-04-09 NOTE — PROGRESS NOTE ADULT - PROBLEM SELECTOR PLAN 4
In the setting of suspected COVID infection. Comfortable on 2L NC at this time.  - Monitor respiratory status and treat supportively  - 4/7 on RA as pt not tolerating NC, satting in low 90s - A&Ox2-3, though appears slightly depressed, dejected and desires to go home  - Monitor mental status  - Continue to treat metabolic and infectious causes of encephalopathy    Interval events:  - Per family, altered, A&Ox0 on presentation in the setting of depression s/p passing of his mother, with very poor PO, now found to be severely hypernatremic with hypoxia due to suspected COVID infection  - Exam non-focal; CTH negative

## 2020-04-09 NOTE — PROVIDER CONTACT NOTE (CRITICAL VALUE NOTIFICATION) - TEST AND RESULT REPORTED:
sodium is 162
soduim 168
sodium 163
Sodium = 168
sodium at 166 Potassium at 3.9 chloride 130
sodium is 1612
sodium is 164
CMP: Sodium 168, Potassium 0.8

## 2020-04-09 NOTE — PROGRESS NOTE ADULT - PROBLEM SELECTOR PLAN 1
- COVID19 PCR positive  - Chest imaging consistent with multifocal pneumonia  - Continue with supplemental oxygen with goal SpO2>92, if requires escalate to NRB and avoid BiLevel/High Flow Nasal Cannula per institution policy given risk of aerosolization  - started Plaquenil 4/7-4/12  - Holding abx at this time  - f/u cbc with diff, CMP w/ lytes, coag, D-dimer, procalcitonin, CRP, LDH, Ferritin, Lactate, G6PD  - monitor EKG for QTc prolongation  - isolation precautions - Na 165 --> 163  - Nephrology following; c/w D5W @ 175. BMP Q8H. Aim for correction of no more than 8 mEq/24 hrs    Interval events:  - Likely in the setting of suspected dehydration from poor PO over the past 2 weeks. Patient received 2L IV fluid in the field by his daughter (EMT) prior to arrival.  Serum Na 167 despite IV NS repletion. Started on additional IV NS in the ED.

## 2020-04-09 NOTE — PROGRESS NOTE ADULT - ASSESSMENT
74M no PMH (on no medications) who presented to the ED with generalized weakness and confusion in the setting of poor PO and depressed mood, found to be hypernatremic and hypoxic with COVID infection. 74M no PMH (on no medications) who presented to the ED with generalized weakness and confusion in the setting of poor PO and depressed mood, found to be hypernatremic and hypoxic with COVID infection. Currently on plaquenil. Still hypernatremic to 160s, on D5W.

## 2020-04-09 NOTE — PROGRESS NOTE ADULT - PROBLEM SELECTOR PLAN 5
Patient noted to have transaminitis AST/ALT 77/45, and elevated T-bili 2.6. INR pending. Daughter denies significant history of alcohol use. Transaminitis may be explained by potential COVID infection, although it is unclear whether bilirubin elevation is predominantly direct or indirect.  - Follow-up repeat bilirubin and D-bilirubin level. Follow-up haptoglobin and repeat LDH in the setting of anemia. If primarily direct, may consider RUQ US.  - Trend transaminases  - f/u hepatitis panel  - Minimize hepatotoxic agents  - deferred RUQ US for now - AST/ALT downtrending (46/39), and T bili downtrending (1.6)    Interval events:  - Transaminitis on admission (AST/ALT 77/49) with elevated bilirubin 2.6   - Daughter denies significant history of alcohol use  - Biliribin mixed (Direct 1.2, indirect 1.3 on 4/7)  - Hepatitis panel negative  - Limit hepatotoxic agents

## 2020-04-09 NOTE — PROVIDER CONTACT NOTE (CRITICAL VALUE NOTIFICATION) - PERSON GIVING RESULT:
Ramon Viera--Chemistry LAB
melvin bowman
Angie Brower
lab Criselda Adam
melvin bowman
LAB: Angy Cast
lab Oneil Benz
Serg Chang from lab department

## 2020-04-09 NOTE — PHYSICAL THERAPY INITIAL EVALUATION ADULT - PERTINENT HX OF CURRENT PROBLEM, REHAB EVAL
74M presented to the ED by EMS with generalized weakness and confusion. Per daughter (EMT) he is A&Ox3 at baseline, his mother passed away 3/26 and he has since been depressed, with total body weakness, decreased appetite, and change in behavior. In the ED, he was altered from baseline at A&Ox1, afebrile, tachycardic to 116 bpm. CT Head wnl, CXR with patchy opacities in the LLL field and possibly RUL field, consistent w/ possible infection. 4/6: COVID +

## 2020-04-09 NOTE — PROVIDER CONTACT NOTE (CRITICAL VALUE NOTIFICATION) - NAME OF MD/NP/PA/DO NOTIFIED:
Dr TERRI Coates
MD Sierra
Dr Rad Sierra
team 2
MD Ashu Sierra
Dr MONIQUE Sepulveda
md from team 2 at [phone]

## 2020-04-09 NOTE — PHYSICAL THERAPY INITIAL EVALUATION ADULT - ADDITIONAL COMMENTS
As per patient, he lives with spouse in a private house (3 steps to enter, pt can stay on main floor) and is independent with adls. Pt does not use an AD.

## 2020-04-09 NOTE — PROVIDER CONTACT NOTE (CRITICAL VALUE NOTIFICATION) - ACTION/TREATMENT ORDERED:
MD made aware. MD to change IVF and order replacements. awaiting orders. will continue to monitor. pt safety maintained
No further instructions at this time
md zavala
start D5 at 150cc/hour for 24hours as per MD orders. Pt safety maintained, will continue to monitor.
no orders received
no new orders received
md zavala

## 2020-04-09 NOTE — PROGRESS NOTE ADULT - SUBJECTIVE AND OBJECTIVE BOX
[facility] DIVISION OF KIDNEY DISEASES AND HYPERTENSION -- FOLLOW UP NOTE  --------------------------------------------------------------------------------  Chief Complaint:/subjective: no acute events noted; 1    24 hour events:as above        PAST HISTORY  --------------------------------------------------------------------------------  No significant changes to PMH, PSH, FHx, SHx, unless otherwise noted    ALLERGIES & MEDICATIONS  --------------------------------------------------------------------------------  Allergies    No Known Allergies    Intolerances      Standing Inpatient Medications  dextrose 5%. 1000 milliLiter(s) IV Continuous <Continuous>  enoxaparin Injectable 40 milliGRAM(s) SubCutaneous daily  hydroxychloroquine   Oral   hydroxychloroquine 200 milliGRAM(s) Oral every 12 hours    PRN Inpatient Medications      REVIEW OF SYSTEMS  --------------------------------------------------------------------------------       All other systems were reviewed and are negative per chart review, except as noted.    VITALS/PHYSICAL EXAM  --------------------------------------------------------------------------------  T(C): 36.1 (04-09-20 @ 10:43), Max: 36.9 (04-08-20 @ 13:01)  HR: 87 (04-09-20 @ 05:47) (80 - 94)  BP: 119/72 (04-09-20 @ 10:43) (106/68 - 119/72)  RR: 18 (04-09-20 @ 10:43) (18 - 19)  SpO2: 94% (04-09-20 @ 10:43) (94% - 95%)  Wt(kg): --  Adult Advanced Hemodynamics Last 24 Hrs  ABP: --  ABP(mean): --  CVP(mm Hg): --  CO: --  CI: --  PA: --  PA(mean): --  PCWP: --  SVR: --  SVRI: --        04-08-20 @ 07:01  -  04-09-20 @ 07:00  --------------------------------------------------------  IN: 1350 mL / OUT: 1600 mL / NET: -250 mL    04-09-20 @ 07:01  -  04-09-20 @ 11:14  --------------------------------------------------------  IN: 180 mL / OUT: 500 mL / NET: -320 mL      Physical Exam:  	 deferred due to covid positive and in effort [employer] PPE  used IM exam for today:  CONSTITUTIONAL: No acute distress. Awake and alert.  HEAD: No evidence of trauma. Structures WNL.  EYES: +PERRL. +EOMI. No scleral icterus. No conjunctival injection.  ENT: Moist oral mucosa. No erythema. No pharyngeal exudates.   NECK: Supple. Appropriate ROM. No stiffness. No masses or lymphadenopathy.  RESPIRATORY: CTAB. No wheezes, rales, or rhonchi. No accessory muscle use. No apparent respiratory distress.  CARDIOVASCULAR: +S1/S2. No audible S3/S4. Regular rate and rhythm. No murmurs, rubs, or gallops. 2+ radial pulses x b/l UE; 2+ DP pulses x b/l LE.   GASTROINTESTINAL: Soft, nontender, nondistended. +BS. No rebound or guarding.   BACK: No spinal or paraspinal tenderness. No CVA tenderness.  EXTREMITY: No LE swelling or edema. EXTs warm to touch.  MUSCULOSKELETAL: Spontaneous movement in all extremities.  DERMATOLOGICAL: No abnormal rashes or lesions.  NEUROLOGICAL: CN 2-12 grossly intact. No focal deficits. Sensation intact x 4EXT. A&Ox3 (oriented to person, place, and time).  PSYCHIATRIC: Appropriate affect.      LABS/STUDIES  --------------------------------------------------------------------------------              10.4   3.63  >-----------<  276      [04-09-20 @ 08:28]              33.8     Hemoglobin: 10.4 g/dL (04-09-20 @ 08:28)  Hemoglobin: 10.4 g/dL (04-08-20 @ 06:58)    Platelet Count - Automated: 276 K/uL (04-09-20 @ 08:28)  Platelet Count - Automated: 235 K/uL (04-08-20 @ 06:58)    163  |  127  |  18  ----------------------------<  165      [04-09-20 @ 08:28]  3.7   |  27  |  0.94        Ca     9.3     [04-09-20 @ 08:28]      Mg     2.1     [04-09-20 @ 08:28]      Phos  2.6     [04-09-20 @ 08:28]    TPro  6.3  /  Alb  2.8  /  TBili  1.6  /  DBili  x   /  AST  46  /  ALT  39  /  AlkPhos  72  [04-09-20 @ 08:28]        Serum Osmolality 350      [04-08-20 @ 00:41]    Creatinine Trend:  SCr 0.94 [04-09 @ 08:28]  SCr 0.91 [04-09 @ 01:11]  SCr 0.90 [04-08 @ 16:18]  SCr 0.92 [04-08 @ 06:58]  SCr 0.94 [04-07 @ 22:57]    Urinalysis - [04-06-20 @ 19:39]      Color Dark Yellow / Appearance Clear / SG 1.014 / pH 6.0      Gluc Negative / Ketone Small  / Bili Small / Urobili 8 mg/dL       Blood Trace / Protein 100 / Leuk Est Negative / Nitrite Negative      RBC 4 / WBC 12 / Hyaline 6 / Gran  / Sq Epi  / Non Sq Epi 2 / Bacteria Negative    Urine Osmolality 440      [04-08-20 @ 04:21]    Iron 25, TIBC 192, %sat 13      [04-07-20 @ 08:11]  Ferritin 3157      [04-07-20 @ 01:18]  HbA1c 5.6      [04-07-20 @ 07:40]  TSH 0.63      [04-07-20 @ 08:28]    HBsAg Nonreact      [04-07-20 @ 08:17]  HCV 0.33, Nonreact      [04-07-20 @ 08:17]

## 2020-04-10 LAB
ALBUMIN SERPL ELPH-MCNC: 3 G/DL — LOW (ref 3.3–5)
ALP SERPL-CCNC: 73 U/L — SIGNIFICANT CHANGE UP (ref 40–120)
ALT FLD-CCNC: 38 U/L — SIGNIFICANT CHANGE UP (ref 10–45)
ANION GAP SERPL CALC-SCNC: 10 MMOL/L — SIGNIFICANT CHANGE UP (ref 5–17)
ANION GAP SERPL CALC-SCNC: 11 MMOL/L — SIGNIFICANT CHANGE UP (ref 5–17)
ANION GAP SERPL CALC-SCNC: 12 MMOL/L — SIGNIFICANT CHANGE UP (ref 5–17)
AST SERPL-CCNC: 51 U/L — HIGH (ref 10–40)
BASOPHILS # BLD AUTO: 0.01 K/UL — SIGNIFICANT CHANGE UP (ref 0–0.2)
BASOPHILS NFR BLD AUTO: 0.2 % — SIGNIFICANT CHANGE UP (ref 0–2)
BILIRUB SERPL-MCNC: 1.4 MG/DL — HIGH (ref 0.2–1.2)
BUN SERPL-MCNC: 14 MG/DL — SIGNIFICANT CHANGE UP (ref 7–23)
BUN SERPL-MCNC: 16 MG/DL — SIGNIFICANT CHANGE UP (ref 7–23)
BUN SERPL-MCNC: 17 MG/DL — SIGNIFICANT CHANGE UP (ref 7–23)
CALCIUM SERPL-MCNC: 9.1 MG/DL — SIGNIFICANT CHANGE UP (ref 8.4–10.5)
CALCIUM SERPL-MCNC: 9.6 MG/DL — SIGNIFICANT CHANGE UP (ref 8.4–10.5)
CALCIUM SERPL-MCNC: 9.8 MG/DL — SIGNIFICANT CHANGE UP (ref 8.4–10.5)
CHLORIDE SERPL-SCNC: 111 MMOL/L — HIGH (ref 96–108)
CHLORIDE SERPL-SCNC: 120 MMOL/L — HIGH (ref 96–108)
CHLORIDE SERPL-SCNC: 122 MMOL/L — HIGH (ref 96–108)
CO2 SERPL-SCNC: 24 MMOL/L — SIGNIFICANT CHANGE UP (ref 22–31)
CO2 SERPL-SCNC: 25 MMOL/L — SIGNIFICANT CHANGE UP (ref 22–31)
CO2 SERPL-SCNC: 26 MMOL/L — SIGNIFICANT CHANGE UP (ref 22–31)
CREAT SERPL-MCNC: 0.8 MG/DL — SIGNIFICANT CHANGE UP (ref 0.5–1.3)
CREAT SERPL-MCNC: 0.83 MG/DL — SIGNIFICANT CHANGE UP (ref 0.5–1.3)
CREAT SERPL-MCNC: 0.86 MG/DL — SIGNIFICANT CHANGE UP (ref 0.5–1.3)
EOSINOPHIL # BLD AUTO: 0.09 K/UL — SIGNIFICANT CHANGE UP (ref 0–0.5)
EOSINOPHIL NFR BLD AUTO: 2.2 % — SIGNIFICANT CHANGE UP (ref 0–6)
GLUCOSE SERPL-MCNC: 146 MG/DL — HIGH (ref 70–99)
GLUCOSE SERPL-MCNC: 146 MG/DL — HIGH (ref 70–99)
GLUCOSE SERPL-MCNC: 344 MG/DL — HIGH (ref 70–99)
HCT VFR BLD CALC: 34 % — LOW (ref 39–50)
HGB BLD-MCNC: 10.5 G/DL — LOW (ref 13–17)
IMM GRANULOCYTES NFR BLD AUTO: 0.5 % — SIGNIFICANT CHANGE UP (ref 0–1.5)
LYMPHOCYTES # BLD AUTO: 0.79 K/UL — LOW (ref 1–3.3)
LYMPHOCYTES # BLD AUTO: 19.5 % — SIGNIFICANT CHANGE UP (ref 13–44)
MAGNESIUM SERPL-MCNC: 2.1 MG/DL — SIGNIFICANT CHANGE UP (ref 1.6–2.6)
MAGNESIUM SERPL-MCNC: 2.2 MG/DL — SIGNIFICANT CHANGE UP (ref 1.6–2.6)
MCHC RBC-ENTMCNC: 29.8 PG — SIGNIFICANT CHANGE UP (ref 27–34)
MCHC RBC-ENTMCNC: 30.9 GM/DL — LOW (ref 32–36)
MCV RBC AUTO: 96.6 FL — SIGNIFICANT CHANGE UP (ref 80–100)
MONOCYTES # BLD AUTO: 0.29 K/UL — SIGNIFICANT CHANGE UP (ref 0–0.9)
MONOCYTES NFR BLD AUTO: 7.1 % — SIGNIFICANT CHANGE UP (ref 2–14)
NEUTROPHILS # BLD AUTO: 2.86 K/UL — SIGNIFICANT CHANGE UP (ref 1.8–7.4)
NEUTROPHILS NFR BLD AUTO: 70.5 % — SIGNIFICANT CHANGE UP (ref 43–77)
NRBC # BLD: 0 /100 WBCS — SIGNIFICANT CHANGE UP (ref 0–0)
PHOSPHATE SERPL-MCNC: 2.3 MG/DL — LOW (ref 2.5–4.5)
PHOSPHATE SERPL-MCNC: 2.3 MG/DL — LOW (ref 2.5–4.5)
PLATELET # BLD AUTO: 263 K/UL — SIGNIFICANT CHANGE UP (ref 150–400)
POTASSIUM SERPL-MCNC: 3.4 MMOL/L — LOW (ref 3.5–5.3)
POTASSIUM SERPL-MCNC: 3.6 MMOL/L — SIGNIFICANT CHANGE UP (ref 3.5–5.3)
POTASSIUM SERPL-MCNC: 3.7 MMOL/L — SIGNIFICANT CHANGE UP (ref 3.5–5.3)
POTASSIUM SERPL-SCNC: 3.4 MMOL/L — LOW (ref 3.5–5.3)
POTASSIUM SERPL-SCNC: 3.6 MMOL/L — SIGNIFICANT CHANGE UP (ref 3.5–5.3)
POTASSIUM SERPL-SCNC: 3.7 MMOL/L — SIGNIFICANT CHANGE UP (ref 3.5–5.3)
PROT SERPL-MCNC: 6.3 G/DL — SIGNIFICANT CHANGE UP (ref 6–8.3)
RBC # BLD: 3.52 M/UL — LOW (ref 4.2–5.8)
RBC # FLD: 12.3 % — SIGNIFICANT CHANGE UP (ref 10.3–14.5)
SODIUM SERPL-SCNC: 147 MMOL/L — HIGH (ref 135–145)
SODIUM SERPL-SCNC: 156 MMOL/L — HIGH (ref 135–145)
SODIUM SERPL-SCNC: 158 MMOL/L — HIGH (ref 135–145)
WBC # BLD: 4.06 K/UL — SIGNIFICANT CHANGE UP (ref 3.8–10.5)
WBC # FLD AUTO: 4.06 K/UL — SIGNIFICANT CHANGE UP (ref 3.8–10.5)

## 2020-04-10 PROCEDURE — 99232 SBSQ HOSP IP/OBS MODERATE 35: CPT | Mod: GC

## 2020-04-10 PROCEDURE — 99232 SBSQ HOSP IP/OBS MODERATE 35: CPT

## 2020-04-10 RX ORDER — SODIUM CHLORIDE 9 MG/ML
1000 INJECTION, SOLUTION INTRAVENOUS
Refills: 0 | Status: DISCONTINUED | OUTPATIENT
Start: 2020-04-10 | End: 2020-04-10

## 2020-04-10 RX ORDER — POTASSIUM PHOSPHATE, MONOBASIC POTASSIUM PHOSPHATE, DIBASIC 236; 224 MG/ML; MG/ML
15 INJECTION, SOLUTION INTRAVENOUS ONCE
Refills: 0 | Status: COMPLETED | OUTPATIENT
Start: 2020-04-10 | End: 2020-04-10

## 2020-04-10 RX ADMIN — Medication 200 MILLIGRAM(S): at 12:00

## 2020-04-10 RX ADMIN — Medication 1 TABLET(S): at 15:47

## 2020-04-10 RX ADMIN — POTASSIUM PHOSPHATE, MONOBASIC POTASSIUM PHOSPHATE, DIBASIC 62.5 MILLIMOLE(S): 236; 224 INJECTION, SOLUTION INTRAVENOUS at 15:46

## 2020-04-10 RX ADMIN — SODIUM CHLORIDE 150 MILLILITER(S): 9 INJECTION, SOLUTION INTRAVENOUS at 04:56

## 2020-04-10 RX ADMIN — ENOXAPARIN SODIUM 40 MILLIGRAM(S): 100 INJECTION SUBCUTANEOUS at 12:00

## 2020-04-10 RX ADMIN — Medication 200 MILLIGRAM(S): at 22:13

## 2020-04-10 NOTE — PROGRESS NOTE ADULT - PROBLEM SELECTOR PLAN 2
- C/w plaquenil (4/7 - 4/12)  - Isolation precautions    Interval events:  - Presented with weakness, AMS  - Chest imaging consistent with multifocal pneumonia  - COVID19 PCR positive

## 2020-04-10 NOTE — PROGRESS NOTE ADULT - ASSESSMENT
74 year old male presented with change in mental status, noted to be hypernatremic and covid19 positive     #Hypernatremia-  chronic (ie>48hours) based on history  -likely due to volume depletion/decreased po hydration and intake  -per team pt with no vomiting or diarrhea  - on d5w 175cc/hr   -downtrending sodium  -check sodium levels q 8hr     -glucose control to avoid osmotic diuresis with resultant hypernatremia     #hypokalemia- replete as needed    #hypophosphatemia- replete as needed    #covid19 +- treatment per medicial team 74 year old male presented with change in mental status, noted to be hypernatremic and covid19 positive     #Hypernatremia-  chronic (ie>48hours) based on history  -likely due to volume depletion/decreased po hydration and intake  -per team pt with no vomiting or diarrhea  - on d5w 175cc/hr, decrease this weekend  per sodium level   -downtrending sodium  -check sodium levels daily   -glucose control to avoid osmotic diuresis with resultant hypernatremia     #hypokalemia- replete as needed    #hypophosphatemia- replete as needed    #covid19 +- treatment per medicial team

## 2020-04-10 NOTE — PROGRESS NOTE ADULT - SUBJECTIVE AND OBJECTIVE BOX
[facility] DIVISION OF KIDNEY DISEASES AND HYPERTENSION -- FOLLOW UP NOTE  --------------------------------------------------------------------------------  Chief Complaint:/subjective: no acute events, no complaints per IM note    24 hour events:as above        PAST HISTORY  --------------------------------------------------------------------------------  No significant changes to PMH, PSH, FHx, SHx, unless otherwise noted    ALLERGIES & MEDICATIONS  --------------------------------------------------------------------------------  Allergies    No Known Allergies    Intolerances      Standing Inpatient Medications  dextrose 5%. 1000 milliLiter(s) IV Continuous <Continuous>  enoxaparin Injectable 40 milliGRAM(s) SubCutaneous daily  hydroxychloroquine   Oral   hydroxychloroquine 200 milliGRAM(s) Oral every 12 hours    PRN Inpatient Medications      REVIEW OF SYSTEMS  --------------------------------------------------------------------------------     All other systems were reviewed and are negative per chart, except as noted.    VITALS/PHYSICAL EXAM  --------------------------------------------------------------------------------  T(C): 36.4 (04-10-20 @ 05:31), Max: 36.8 (04-09-20 @ 14:14)  HR: 82 (04-10-20 @ 05:31) (80 - 83)  BP: 109/68 (04-10-20 @ 05:31) (109/68 - 124/75)  RR: 18 (04-10-20 @ 05:31) (18 - 18)  SpO2: 98% (04-10-20 @ 05:31) (94% - 98%)  Wt(kg): --  Adult Advanced Hemodynamics Last 24 Hrs  ABP: --  ABP(mean): --  CVP(mm Hg): --  CO: --  CI: --  PA: --  PA(mean): --  PCWP: --  SVR: --  SVRI: --        04-09-20 @ 07:01  -  04-10-20 @ 07:00  --------------------------------------------------------  IN: 650 mL / OUT: 1430 mL / NET: -780 mL    04-10-20 @ 07:01  -  04-10-20 @ 09:56  --------------------------------------------------------  IN: 0 mL / OUT: 200 mL / NET: -200 mL      Physical Exam:  	deferred due to covid19 positive and in effort to preserve PPE  used IM exam for today  LABS/STUDIES  --------------------------------------------------------------------------------              10.5   4.06  >-----------<  263      [04-10-20 @ 07:21]              34.0     Hemoglobin: 10.5 g/dL (04-10-20 @ 07:21)  Hemoglobin: 10.4 g/dL (04-09-20 @ 08:28)    Platelet Count - Automated: 263 K/uL (04-10-20 @ 07:21)  Platelet Count - Automated: 276 K/uL (04-09-20 @ 08:28)    156  |  120  |  17  ----------------------------<  146      [04-10-20 @ 07:19]  3.7   |  24  |  0.86        Ca     9.8     [04-10-20 @ 07:19]      Mg     2.2     [04-10-20 @ 07:19]      Phos  2.3     [04-10-20 @ 07:19]    TPro  6.3  /  Alb  3.0  /  TBili  1.4  /  DBili  x   /  AST  51  /  ALT  38  /  AlkPhos  73  [04-10-20 @ 07:19]          Creatinine Trend:  SCr 0.86 [04-10 @ 07:19]  SCr 0.83 [04-10 @ 00:46]  SCr 0.89 [04-09 @ 16:14]  SCr 0.94 [04-09 @ 08:28]  SCr 0.91 [04-09 @ 01:11]    Urinalysis - [04-06-20 @ 19:39]      Color Dark Yellow / Appearance Clear / SG 1.014 / pH 6.0      Gluc Negative / Ketone Small  / Bili Small / Urobili 8 mg/dL       Blood Trace / Protein 100 / Leuk Est Negative / Nitrite Negative      RBC 4 / WBC 12 / Hyaline 6 / Gran  / Sq Epi  / Non Sq Epi 2 / Bacteria Negative    Urine Osmolality 440      [04-08-20 @ 04:21]    Iron 25, TIBC 192, %sat 13      [04-07-20 @ 08:11]  Ferritin 2127      [04-09-20 @ 10:12]  HbA1c 5.6      [04-07-20 @ 07:40]  TSH 0.63      [04-07-20 @ 08:28]    HBsAg Nonreact      [04-07-20 @ 08:17]  HCV 0.33, Nonreact      [04-07-20 @ 08:17]

## 2020-04-10 NOTE — PROGRESS NOTE ADULT - PROBLEM SELECTOR PLAN 5
- AST/ALT downtrending (46/39), and T bili downtrending (1.6)    Interval events:  - Transaminitis on admission (AST/ALT 77/49) with elevated bilirubin 2.6   - Daughter denies significant history of alcohol use  - Biliribin mixed (Direct 1.2, indirect 1.3 on 4/7)  - Hepatitis panel negative  - Limit hepatotoxic agents

## 2020-04-10 NOTE — PROGRESS NOTE ADULT - PROBLEM SELECTOR PLAN 6
- DVT PPE: Lovenox  - Diet: DASH/TLC diet to limit hypernatremia  - Dispo: Home with home PT (per PT recs 4/9)  - Code status: FULL CODE

## 2020-04-10 NOTE — PROGRESS NOTE ADULT - PROBLEM SELECTOR PLAN 4
- A&Ox2-3, though appears slightly depressed, dejected and desires to go home  - Monitor mental status  - Continue to treat metabolic and infectious causes of encephalopathy    Interval events:  - Per family, altered, A&Ox0 on presentation in the setting of depression s/p passing of his mother, with very poor PO, now found to be severely hypernatremic with hypoxia due to suspected COVID infection  - Exam non-focal; CTH negative

## 2020-04-10 NOTE — CHART NOTE - NSCHARTNOTEFT_GEN_A_CORE
Nutrition Initial Assessment    Nutrition Consult Received: Yes [   ]  No [   x]    Reason for Initial Nutrition Assessment: Length Of Stay     Source of Information: Unable to conduct a facet to face interview due to limited contact restrictions related to pt's medical condition and isolation precautions. Attempt to reach patient via phone with  multiple times however no answer, spoke with daughter Julieta, additional information obtained from EMR    Admitting Diagnosis: This is a 74M no PMH (on no medications) who presented to the ED with generalized weakness and confusion in the setting of poor PO and depressed mood, found to be hypernatremic and hypoxic with COVID.     PAST MEDICAL & SURGICAL HISTORY:  No pertinent past medical history  H/O hernia repair    Subjective Information: Daughter reports pt with poor PO intake and appetite x ~ 2 weeks, states pt became very depressed when his mother passed away on 3/26. Reports pt with consuming porridge and/or chicken soup but was eating very little also was not drinking fluids despite encouragement. States pt is at baseline a good eater, likes to cook and drinks plenty of water. Daughter reports pt is generally a "healthy eater, eats a balanced diet at home". Confirms NKFA, reports pt recently started taking vitamin C zinc and vitamin B12. Pt with no chewing/swallowing difficulty, nausea, vomiting, diarrhea, constipation PTA per daughter report. Obtained food preferences from daughter, reviewed alternative menu items. Discussed importance of PO intake and prioritizing sources of protein to maintain lean body mass.  Daughter amendable to pt receiving Ensure Enlive supplements, reports pt has had that in the past and enjoys it. Daughter with no nutrition related questions at this time. Pt made aware RD remains available as needed.     GI Issues: last BM 4/9, pt noted with one episode of emesis this morning (4/10).     Current Nutrition Order: DASH     PO Intake:   Good (%) [   ]    Fair (50-75%) [   ]    Poor (<50%) [   x]    Skin Integrity: free of pressure injuries per nursing flow sheets   Edema: +1 left arm     Labs:   04-10 Na156 mmol/L<H> Glu 146 mg/dL<H> K+ 3.7 mmol/L Cr  0.86 mg/dL BUN 17 mg/dL Phos 2.3 mg/dL<L> Alb 3.0 g/dL<L> PAB n/a       Pt also on D5W @ 150ml/hr in setting of hypernatremia, Nephrology following.       Hemoglobin A1C, Whole Blood: 5.6 % (04-07-20 @ 07:40)      Medications:  MEDICATIONS  (STANDING):  dextrose 5%. 1000 milliLiter(s) (150 mL/Hr) IV Continuous <Continuous>  enoxaparin Injectable 40 milliGRAM(s) SubCutaneous daily  hydroxychloroquine   Oral   hydroxychloroquine 200 milliGRAM(s) Oral every 12 hours    MEDICATIONS  (PRN):    Admitted Anthropometrics:    Height (cm): 170.18 (04-06-20 @ 18:18)  Weight (kg): 70.3 (04-06-20 @ 18:18)  BMI (kg/m2): 24.3 (04-06-20 @ 18:18)    Weight History: Daughter reports pt with UBW of 150lbs, current dosing weight is 154.7lbs. Question accuracy of stated UBW, suspect weight loss in setting of poor PO intake x 2 weeks, no additional weights to assess, will continue to monitor.      Nutrition Focused Physical Exam: Unable to complete due to limited isolation contact precautions at this time.     Estimated Energy Needs (30kcal/kg-35 kcal/kg): 2109-2461Kcal/day  Estimated Protein Needs (1 g/kg-1.2 g/kg): 70-84gm/day  Based on weight of: dosing weight: 70.3Kg     [  ] No active nutrition diagnosis at this time  [  ] Current medical condition precludes nutrition intervention  [ x ] Nutrition Diagnosis: Inadequate protein-energy intake in setting of decreased appetite, AMS as evidenced by pt with poor PO intake x 2 weeks PTA    Goal: pt to meet >75% of estimated needs     Nutrition Interventions:     Recommendations:  1) Continue current diet as tolerated, consider liberalizing to low sodium.   2) Recommend Ensure Enlive BID to supplement PO intake.   3) Recommend addition of multivitamin once daily.   4) Monitor electrolytes closely and replete as needed.    5)Obtain/honor food preferences as able   6) RD to remain available and follow-up as medically appropriate.     RD to follow-up per protocol.  Addis Guerra RD, CDN, Pager # [phone]

## 2020-04-10 NOTE — PROGRESS NOTE ADULT - ASSESSMENT
74M no PMH (on no medications) who presented to the ED with generalized weakness and confusion in the setting of poor PO and depressed mood, found to be hypernatremic and hypoxic with COVID infection. Currently on plaquenil. Still hypernatremic to 160s, on D5W.

## 2020-04-10 NOTE — PROGRESS NOTE ADULT - PROBLEM SELECTOR PLAN 7
- Discussed GOC with patient's daughterJulieta  - Patient is full code at this time  - HCPs are patient's wife and daughter [phone]

## 2020-04-10 NOTE — PROGRESS NOTE ADULT - PROBLEM SELECTOR PLAN 1
- Na 165 --> 163 -> 158  - Nephrology following; c/w D5W @ 175. BMP Q8H. Aim for correction of no more than 8 mEq/24 hrs    Interval events:  - Likely in the setting of suspected dehydration from poor PO over the past 2 weeks. Patient received 2L IV fluid in the field by his daughter (EMT) prior to arrival.  Serum Na 167 despite IV NS repletion. Started on additional IV NS in the ED. - Na 165 --> 163 -> 158  - Nephrology following; c/w D5W @ 150. BMP Q8H. Aim for correction of no more than 8 mEq/24 hrs    Interval events:  - Likely in the setting of suspected dehydration from poor PO over the past 2 weeks. Patient received 2L IV fluid in the field by his daughter (EMT) prior to arrival.  Serum Na 167 despite IV NS repletion. Started on additional IV NS in the ED.

## 2020-04-10 NOTE — PROGRESS NOTE ADULT - SUBJECTIVE AND OBJECTIVE BOX
Medicine Progress Note    Patient is a 74y old  Male who presents with a chief complaint of Suspected COVID, hypoxia, AMS, Hypernatremia (09 Apr 2020 11:13)      SUBJECTIVE / OVERNIGHT EVENTS: No acute events overnight; restarted d5w at 175 cc/hr due to elevated Na of 158    ADDITIONAL REVIEW OF SYSTEMS: No CP, SOB, fever, chills, nausea, vomiting, diarrhea, abdominal pain.     MEDICATIONS  (STANDING):  dextrose 5%. 1000 milliLiter(s) (150 mL/Hr) IV Continuous <Continuous>  enoxaparin Injectable 40 milliGRAM(s) SubCutaneous daily  hydroxychloroquine   Oral   hydroxychloroquine 200 milliGRAM(s) Oral every 12 hours    MEDICATIONS  (PRN):    CAPILLARY BLOOD GLUCOSE        I&O's Summary    09 Apr 2020 07:01  -  10 Apr 2020 07:00  --------------------------------------------------------  IN: 650 mL / OUT: 1430 mL / NET: -780 mL        PHYSICAL EXAM:  Vital Signs Last 24 Hrs  T(C): 36.4 (10 Apr 2020 05:31), Max: 36.8 (09 Apr 2020 14:14)  T(F): 97.6 (10 Apr 2020 05:31), Max: 98.2 (09 Apr 2020 14:14)  HR: 82 (10 Apr 2020 05:31) (80 - 83)  BP: 109/68 (10 Apr 2020 05:31) (109/68 - 124/75)  BP(mean): --  RR: 18 (10 Apr 2020 05:31) (18 - 18)  SpO2: 98% (10 Apr 2020 05:31) (94% - 98%)    CONSTITUTIONAL: Thin and sad-appearing older gentleman. No acute distress.  EYES: No scleral icterus. No conjunctival injection.  ENT: Slightly dry oral mucosa.   RESPIRATORY: CTAB. No wheezes, rales, or rhonchi. No accessory muscle use. No apparent respiratory distress.  CARDIOVASCULAR: +S1/S2. No audible S3/S4. Regular rate and rhythm. No murmurs, rubs, or gallops.    GASTROINTESTINAL: Soft, nontender, nondistended. +BS. No rebound or guarding.   EXTREMITY: No LE swelling or edema. EXTs warm to touch.  MUSCULOSKELETAL: Spontaneous movement in all extremities.  DERMATOLOGICAL: No abnormal rashes or lesions.  NEUROLOGICAL: Nonfocal.  PSYCHIATRIC: Appears slightly depressed.    LABS:                        10.5   4.06  )-----------( 263      ( 10 Apr 2020 07:21 )             34.0     04-10    156<H>  |  120<H>  |  17  ----------------------------<  146<H>  3.7   |  24  |  0.86    Ca    9.8      10 Apr 2020 07:19  Phos  2.3     04-10  Mg     2.2     04-10    TPro  6.3  /  Alb  3.0<L>  /  TBili  1.4<H>  /  DBili  x   /  AST  51<H>  /  ALT  38  /  AlkPhos  73  04-10              COVID-19 PCR: Detected (06 Apr 2020 19:25)      RADIOLOGY & ADDITIONAL TESTS:  Imaging from Last 24 Hours:    Electrocardiogram/QTc Interval:    COORDINATION OF CARE:  Care Discussed with Consultants/Other Providers:

## 2020-04-11 ENCOUNTER — TRANSCRIPTION ENCOUNTER (OUTPATIENT)
Age: 75
End: 2020-04-11

## 2020-04-11 LAB
ALBUMIN SERPL ELPH-MCNC: 2.9 G/DL — LOW (ref 3.3–5)
ALP SERPL-CCNC: 77 U/L — SIGNIFICANT CHANGE UP (ref 40–120)
ALT FLD-CCNC: 64 U/L — HIGH (ref 10–45)
ANION GAP SERPL CALC-SCNC: 11 MMOL/L — SIGNIFICANT CHANGE UP (ref 5–17)
ANION GAP SERPL CALC-SCNC: 11 MMOL/L — SIGNIFICANT CHANGE UP (ref 5–17)
ANION GAP SERPL CALC-SCNC: 12 MMOL/L — SIGNIFICANT CHANGE UP (ref 5–17)
AST SERPL-CCNC: 92 U/L — HIGH (ref 10–40)
BILIRUB SERPL-MCNC: 1.2 MG/DL — SIGNIFICANT CHANGE UP (ref 0.2–1.2)
BUN SERPL-MCNC: 14 MG/DL — SIGNIFICANT CHANGE UP (ref 7–23)
BUN SERPL-MCNC: 14 MG/DL — SIGNIFICANT CHANGE UP (ref 7–23)
BUN SERPL-MCNC: 16 MG/DL — SIGNIFICANT CHANGE UP (ref 7–23)
CALCIUM SERPL-MCNC: 9.4 MG/DL — SIGNIFICANT CHANGE UP (ref 8.4–10.5)
CALCIUM SERPL-MCNC: 9.5 MG/DL — SIGNIFICANT CHANGE UP (ref 8.4–10.5)
CALCIUM SERPL-MCNC: 9.6 MG/DL — SIGNIFICANT CHANGE UP (ref 8.4–10.5)
CHLORIDE SERPL-SCNC: 116 MMOL/L — HIGH (ref 96–108)
CHLORIDE SERPL-SCNC: 118 MMOL/L — HIGH (ref 96–108)
CHLORIDE SERPL-SCNC: 119 MMOL/L — HIGH (ref 96–108)
CO2 SERPL-SCNC: 23 MMOL/L — SIGNIFICANT CHANGE UP (ref 22–31)
CO2 SERPL-SCNC: 26 MMOL/L — SIGNIFICANT CHANGE UP (ref 22–31)
CO2 SERPL-SCNC: 27 MMOL/L — SIGNIFICANT CHANGE UP (ref 22–31)
CREAT SERPL-MCNC: 0.74 MG/DL — SIGNIFICANT CHANGE UP (ref 0.5–1.3)
CREAT SERPL-MCNC: 0.85 MG/DL — SIGNIFICANT CHANGE UP (ref 0.5–1.3)
CREAT SERPL-MCNC: 0.86 MG/DL — SIGNIFICANT CHANGE UP (ref 0.5–1.3)
CRP SERPL-MCNC: 4.5 MG/DL — HIGH (ref 0–0.4)
CULTURE RESULTS: SIGNIFICANT CHANGE UP
CULTURE RESULTS: SIGNIFICANT CHANGE UP
D DIMER BLD IA.RAPID-MCNC: 196 NG/ML DDU — SIGNIFICANT CHANGE UP
FERRITIN SERPL-MCNC: 1555 NG/ML — HIGH (ref 30–400)
GLUCOSE SERPL-MCNC: 123 MG/DL — HIGH (ref 70–99)
GLUCOSE SERPL-MCNC: 146 MG/DL — HIGH (ref 70–99)
GLUCOSE SERPL-MCNC: 146 MG/DL — HIGH (ref 70–99)
HCT VFR BLD CALC: 32.8 % — LOW (ref 39–50)
HGB BLD-MCNC: 10 G/DL — LOW (ref 13–17)
MAGNESIUM SERPL-MCNC: 2.2 MG/DL — SIGNIFICANT CHANGE UP (ref 1.6–2.6)
MAGNESIUM SERPL-MCNC: 2.3 MG/DL — SIGNIFICANT CHANGE UP (ref 1.6–2.6)
MAGNESIUM SERPL-MCNC: 2.3 MG/DL — SIGNIFICANT CHANGE UP (ref 1.6–2.6)
MCHC RBC-ENTMCNC: 29.4 PG — SIGNIFICANT CHANGE UP (ref 27–34)
MCHC RBC-ENTMCNC: 30.5 GM/DL — LOW (ref 32–36)
MCV RBC AUTO: 96.5 FL — SIGNIFICANT CHANGE UP (ref 80–100)
NRBC # BLD: 0 /100 WBCS — SIGNIFICANT CHANGE UP (ref 0–0)
PHOSPHATE SERPL-MCNC: 2.3 MG/DL — LOW (ref 2.5–4.5)
PHOSPHATE SERPL-MCNC: 2.5 MG/DL — SIGNIFICANT CHANGE UP (ref 2.5–4.5)
PHOSPHATE SERPL-MCNC: 2.8 MG/DL — SIGNIFICANT CHANGE UP (ref 2.5–4.5)
PLATELET # BLD AUTO: 270 K/UL — SIGNIFICANT CHANGE UP (ref 150–400)
POTASSIUM SERPL-MCNC: 3.8 MMOL/L — SIGNIFICANT CHANGE UP (ref 3.5–5.3)
POTASSIUM SERPL-MCNC: 3.9 MMOL/L — SIGNIFICANT CHANGE UP (ref 3.5–5.3)
POTASSIUM SERPL-MCNC: 4.3 MMOL/L — SIGNIFICANT CHANGE UP (ref 3.5–5.3)
POTASSIUM SERPL-SCNC: 3.8 MMOL/L — SIGNIFICANT CHANGE UP (ref 3.5–5.3)
POTASSIUM SERPL-SCNC: 3.9 MMOL/L — SIGNIFICANT CHANGE UP (ref 3.5–5.3)
POTASSIUM SERPL-SCNC: 4.3 MMOL/L — SIGNIFICANT CHANGE UP (ref 3.5–5.3)
PROCALCITONIN SERPL-MCNC: 0.14 NG/ML — HIGH (ref 0.02–0.1)
PROT SERPL-MCNC: 6.3 G/DL — SIGNIFICANT CHANGE UP (ref 6–8.3)
RBC # BLD: 3.4 M/UL — LOW (ref 4.2–5.8)
RBC # FLD: 12.2 % — SIGNIFICANT CHANGE UP (ref 10.3–14.5)
SODIUM SERPL-SCNC: 150 MMOL/L — HIGH (ref 135–145)
SODIUM SERPL-SCNC: 156 MMOL/L — HIGH (ref 135–145)
SODIUM SERPL-SCNC: 157 MMOL/L — HIGH (ref 135–145)
SPECIMEN SOURCE: SIGNIFICANT CHANGE UP
SPECIMEN SOURCE: SIGNIFICANT CHANGE UP
WBC # BLD: 3.81 K/UL — SIGNIFICANT CHANGE UP (ref 3.8–10.5)
WBC # FLD AUTO: 3.81 K/UL — SIGNIFICANT CHANGE UP (ref 3.8–10.5)

## 2020-04-11 PROCEDURE — 99232 SBSQ HOSP IP/OBS MODERATE 35: CPT | Mod: GC

## 2020-04-11 RX ORDER — SODIUM CHLORIDE 9 MG/ML
1000 INJECTION, SOLUTION INTRAVENOUS
Refills: 0 | Status: DISCONTINUED | OUTPATIENT
Start: 2020-04-11 | End: 2020-04-11

## 2020-04-11 RX ORDER — SODIUM CHLORIDE 9 MG/ML
1000 INJECTION, SOLUTION INTRAVENOUS
Refills: 0 | Status: DISCONTINUED | OUTPATIENT
Start: 2020-04-11 | End: 2020-04-12

## 2020-04-11 RX ORDER — SODIUM CHLORIDE 9 MG/ML
1000 INJECTION INTRAMUSCULAR; INTRAVENOUS; SUBCUTANEOUS
Refills: 0 | Status: DISCONTINUED | OUTPATIENT
Start: 2020-04-11 | End: 2020-04-11

## 2020-04-11 RX ADMIN — Medication 200 MILLIGRAM(S): at 12:00

## 2020-04-11 RX ADMIN — ENOXAPARIN SODIUM 40 MILLIGRAM(S): 100 INJECTION SUBCUTANEOUS at 12:04

## 2020-04-11 RX ADMIN — Medication 1 TABLET(S): at 12:04

## 2020-04-11 RX ADMIN — SODIUM CHLORIDE 100 MILLILITER(S): 9 INJECTION, SOLUTION INTRAVENOUS at 11:32

## 2020-04-11 RX ADMIN — Medication 200 MILLIGRAM(S): at 23:35

## 2020-04-11 RX ADMIN — SODIUM CHLORIDE 125 MILLILITER(S): 9 INJECTION INTRAMUSCULAR; INTRAVENOUS; SUBCUTANEOUS at 03:50

## 2020-04-11 NOTE — DISCHARGE NOTE PROVIDER - NSDCCPCAREPLAN_GEN_ALL_CORE_FT
PRINCIPAL DISCHARGE DIAGNOSIS  Diagnosis: Suspected [facility] coronavirus infection  Assessment and Plan of Treatment: You were diagnosed with coronavirus. Common signs include fever/ and/or respiratory symptoms such as cough and shortness of breath.  It is spread person from person usually after close contact with an infected person through droplets and contact. You were treated with supportive care as there is no specific treatement for disease caused by COVID 19.  It has been determined that you no longer need hospitalization and can recover while remaining in self quarantine at home. Please follow the prevention steps below until a healthcare provider or local or state health department says you can return to normal activities. Please restrict activities outside your home, except for getting medical care. Do not go to work, school or public areas. Avoid using public transportation, ride-sharing or taxis. Separate yourself from other people in your home. Stay in a specific room and away from other people in your home. Use a separate bathroom if available. Call ahead before visiting your doctor. Please wear a facemask when your are around other people. Please clean your hands often with soap and water especially if touching your face or eating. Avoid sharing personal household items. Clean all "high touch" surfaces everyday. Monitor your symptoms for worsening difficulty breathing. You can discontinue home isolation 14 days after positive COVID-19 test (4/20). Please call [phone] to speak to a [facility] Coronavirus specialist.        SECONDARY DISCHARGE DIAGNOSES  Diagnosis: Hypernatremia  Assessment and Plan of Treatment: You were found to have high levels of sodium in your blood. This was likely due to a level of poor hydration and oral intake. Please continue to stay hydrated and to maintain nutrition. PRINCIPAL DISCHARGE DIAGNOSIS  Diagnosis: Suspected [facility] coronavirus infection  Assessment and Plan of Treatment: You were diagnosed with coronavirus. Common signs include fever/ and/or respiratory symptoms such as cough and shortness of breath.  It is spread person from person usually after close contact with an infected person through droplets and contact. You were treated with supportive care as there is no specific treatement for disease caused by COVID 19.  It has been determined that you no longer need hospitalization and can recover while remaining in self quarantine at home. Please follow the prevention steps below until a healthcare provider or local or state health department says you can return to normal activities. Please restrict activities outside your home, except for getting medical care. Do not go to work, school or public areas. Avoid using public transportation, ride-sharing or taxis. Separate yourself from other people in your home. Stay in a specific room and away from other people in your home. Use a separate bathroom if available. Call ahead before visiting your doctor. Please wear a facemask when your are around other people. Please clean your hands often with soap and water especially if touching your face or eating. Avoid sharing personal household items. Clean all "high touch" surfaces everyday. Monitor your symptoms for worsening difficulty breathing. You can discontinue home isolation 14 days after positive COVID-19 test (4/20). Please call [phone] to speak to a [facility] Coronavirus specialist.        SECONDARY DISCHARGE DIAGNOSES  Diagnosis: Hypernatremia  Assessment and Plan of Treatment: You were found to have high levels of sodium in your blood. This was likely due to a level of poor hydration and oral intake. Please continue to stay hydrated and to maintain nutrition. Please take Mirtazapine nightly to aid in appetite stimulation.    Diagnosis: Bereavement reaction  Assessment and Plan of Treatment: You had a bereavement reaction. Please follow up with the [facility]. Please continue to take melatonin and mirtazapine to help in sleep and appetite.

## 2020-04-11 NOTE — DISCHARGE NOTE PROVIDER - HOSPITAL COURSE
HPI    74M no PMH (on no medications) who presented to the ED by EMS with generalized weakness and confusion. Per daughter (EMT) he is A&Ox3 at baseline, his mother passed away 3/26 and he has since been depressed, with total body weakness, decreased appetite, and change in behavior. Denied vomiting, diarrhea, pain, fever.     His daughter is an EMT, and she gave him 2L IV saline prior to arrival.        In the ED, he was altered from baseline at A&Ox1, afebrile, tachycardic to 116 bpm, BP (116-130)/(66-78), RR 16-23, SpO2 88% on RA improved to 98% on 2L NC.     Initial labs: WBC 2.19, Hgb 11.0, Plt 207, Na 167, K 3.8, BUN 21, Cr 1.09, T-bili 2.6, AST/ALT 77/45, , Procalcitonin 0.13, D-dimer 454, Fibrinogen 511. Urinalysis - 12 WBC, trace blood, 8 urobilinogen    Initial Imaging: CT Head wnl, CXR with patchy opacities in the LLL field and possibly RUL field, consistent w/ possible infection.        In the ED, he was started on IV  cc/hr. BCx x2, UCx, and COVID test sent.        Hospital Course    Patient came up positive for COVID, but had a minor/relatively asymptomatic infection. Patient required no supplemental oxygen. Patient was started on D5 to aid in correction o            4/7: on d5 100 cc/hr for Na 168, hasnt' changed, nephro called, 150 cc d5w, check midnight labs, adjust accordingly, goal Na 158-160 in 24h    4/9: Per Nephrology, increased D5W to 175 + Q8H labs    4/12: Na+ improvnig; rolling walker script in chart, d/c paperworks started; called psych for c/s tomorrow;     4/13: aim to DC today. F/U BMP and ambulatory O2 sat HPI    74M no PMH (on no medications) who presented to the ED by EMS with generalized weakness and confusion. Per daughter (EMT) he is A&Ox3 at baseline, his mother passed away 3/26 and he has since been depressed, with total body weakness, decreased appetite, and change in behavior. Denied vomiting, diarrhea, pain, fever.     His daughter is an EMT, and she gave him 2L IV saline prior to arrival.        In the ED, he was altered from baseline at A&Ox1, afebrile, tachycardic to 116 bpm, BP (116-130)/(66-78), RR 16-23, SpO2 88% on RA improved to 98% on 2L NC.     Initial labs: WBC 2.19, Hgb 11.0, Plt 207, Na 167, K 3.8, BUN 21, Cr 1.09, T-bili 2.6, AST/ALT 77/45, , Procalcitonin 0.13, D-dimer 454, Fibrinogen 511. Urinalysis - 12 WBC, trace blood, 8 urobilinogen    Initial Imaging: CT Head wnl, CXR with patchy opacities in the LLL field and possibly RUL field, consistent w/ possible infection.        In the ED, he was started on IV  cc/hr. BCx x2, UCx, and COVID test sent.        Hospital Course    Patient came up positive for COVID, but had a minor/relatively asymptomatic infection. Patient required no supplemental oxygen. Patient was started on D5 to aid in correction of hypovolemic hypernatremia. Patient had appropriate correction, and was discharged with normal sodium levels. Psychiatry was consulted to comment on poor PO intake. HPI    74M no PMH (on no medications) who presented to the ED by EMS with generalized weakness and confusion. Per daughter (EMT) he is A&Ox3 at baseline, his mother passed away 3/26 and he has since been depressed, with total body weakness, decreased appetite, and change in behavior. Denied vomiting, diarrhea, pain, fever.     His daughter is an EMT, and she gave him 2L IV saline prior to arrival.        In the ED, he was altered from baseline at A&Ox1, afebrile, tachycardic to 116 bpm, BP (116-130)/(66-78), RR 16-23, SpO2 88% on RA improved to 98% on 2L NC.     Initial labs: WBC 2.19, Hgb 11.0, Plt 207, Na 167, K 3.8, BUN 21, Cr 1.09, T-bili 2.6, AST/ALT 77/45, , Procalcitonin 0.13, D-dimer 454, Fibrinogen 511. Urinalysis - 12 WBC, trace blood, 8 urobilinogen    Initial Imaging: CT Head wnl, CXR with patchy opacities in the LLL field and possibly RUL field, consistent w/ possible infection.        In the ED, he was started on IV  cc/hr. BCx x2, UCx, and COVID test sent.        Hospital Course    Patient came up positive for COVID, but had a minor/relatively asymptomatic infection. Patient required no supplemental oxygen. Patient was started on D5 to aid in correction of hypovolemic hypernatremia. Patient had appropriate correction, and was discharged with normal sodium levels. Psychiatry was consulted to comment on poor PO intake. They diagnosed patient with bereavement reaction and recommended mirtazapine and melatonin for appetite stimulation and for sleep aid. Patient was instructed to follow up outpatient at [facility].

## 2020-04-11 NOTE — DISCHARGE NOTE PROVIDER - NSDCMRMEDTOKEN_GEN_ALL_CORE_FT
rolling walker : Rolling walker   ICD-10: R54 melatonin 3 mg oral tablet: 1 tab(s) orally once a day (at bedtime)   mirtazapine 7.5 mg oral tablet: 1 tab(s) orally once a day (at bedtime)   rolling walker : Rolling walker   ICD-10: R54

## 2020-04-11 NOTE — PROGRESS NOTE ADULT - SUBJECTIVE AND OBJECTIVE BOX
[facility] Division of Kidney Diseases & Hypertension  FOLLOW UP NOTE  [phone]--------------------------------------------------------------------------------  Chief Complaint:Other general symptom or sign      24 hour events/subjective: No acute events overnight. Patient noted to have sodium increase from 147 to 157. Labs, vitals, and medications reviewed. Vital signs grossly stable, afebrile saturating ~95% on RA. Labs with sNa noted as above. Labs otherwise grossly stable.        PAST HISTORY  --------------------------------------------------------------------------------  No significant changes to PMH, PSH, FHx, SHx, unless otherwise noted    ALLERGIES & MEDICATIONS  --------------------------------------------------------------------------------  Allergies    No Known Allergies    Intolerances      Standing Inpatient Medications  enoxaparin Injectable 40 milliGRAM(s) SubCutaneous daily  hydroxychloroquine   Oral   hydroxychloroquine 200 milliGRAM(s) Oral every 12 hours  multivitamin 1 Tablet(s) Oral daily  sodium chloride 0.9%. 1000 milliLiter(s) IV Continuous <Continuous>    PRN Inpatient Medications      REVIEW OF SYSTEMS  --------------------------------------------------------------------------------  Gen: No  fevers/chills  Skin: No rashes  Head/Eyes/Ears/Mouth: No headache; Normal hearing; Normal vision w/o blurriness  Respiratory: No dyspnea, cough, wheezing, hemoptysis  CV: No chest pain, PND, orthopnea  GI: No abdominal pain, diarrhea, constipation, nausea, vomiting  : No increased frequency, dysuria, hematuria, nocturia  MSK: No joint pain/swelling; no back pain; no edema  Neuro: No dizziness/lightheadedness, weakness, seizures, numbness, tingling  Psych: Non-focal      All other systems were reviewed and are negative, except as noted.    VITALS/PHYSICAL EXAM  --------------------------------------------------------------------------------  T(C): 37.1 (04-10-20 @ 22:32), Max: 37.1 (04-10-20 @ 22:32)  HR: 92 (04-10-20 @ 22:32) (85 - 92)  BP: 111/65 (04-10-20 @ 22:32) (111/65 - 118/69)  RR: 18 (04-10-20 @ 22:32) (18 - 18)  SpO2: 95% (04-10-20 @ 22:32) (94% - 95%)  Wt(kg): --        04-09-20 @ 07:01  -  04-10-20 @ 07:00  --------------------------------------------------------  IN: 650 mL / OUT: 1430 mL / NET: -780 mL    04-10-20 @ 07:01  -  04-11-20 @ 06:55  --------------------------------------------------------  IN: 1410 mL / OUT: 1700 mL / NET: -290 mL      Physical Exam: Deferred due to COVID-19 positivity, and preservation of PPE.    LABS/STUDIES  --------------------------------------------------------------------------------              10.5   4.06  >-----------<  263      [04-10-20 @ 07:21]              34.0     157  |  118  |  14  ----------------------------<  146      [04-11-20 @ 00:17]  3.8   |  27  |  0.86        Ca     9.6     [04-11-20 @ 00:17]      Mg     2.2     [04-11-20 @ 00:17]      Phos  2.8     [04-11-20 @ 00:17]    TPro  6.3  /  Alb  3.0  /  TBili  1.4  /  DBili  x   /  AST  51  /  ALT  38  /  AlkPhos  73  [04-10-20 @ 07:19]          Creatinine Trend:  SCr 0.86 [04-11 @ 00:17]  SCr 0.80 [04-10 @ 14:40]  SCr 0.86 [04-10 @ 07:19]  SCr 0.83 [04-10 @ 00:46]  SCr 0.89 [04-09 @ 16:14]    Urinalysis - [04-06-20 @ 19:39]      Color Dark Yellow / Appearance Clear / SG 1.014 / pH 6.0      Gluc Negative / Ketone Small  / Bili Small / Urobili 8 mg/dL       Blood Trace / Protein 100 / Leuk Est Negative / Nitrite Negative      RBC 4 / WBC 12 / Hyaline 6 / Gran  / Sq Epi  / Non Sq Epi 2 / Bacteria Negative    Urine Osmolality 440      [04-08-20 @ 04:21]    Iron 25, TIBC 192, %sat 13      [04-07-20 @ 08:11]  Ferritin 2127      [04-09-20 @ 10:12]  HbA1c 5.6      [04-07-20 @ 07:40]  TSH 0.63      [04-07-20 @ 08:28]    HBsAg Nonreact      [04-07-20 @ 08:17]  HCV 0.33, Nonreact      [04-07-20 @ 08:17]

## 2020-04-11 NOTE — PROGRESS NOTE ADULT - ASSESSMENT
74 year old male presented with change in mental status, noted to be hypernatremic and covid19 positive     #Hypernatremia-  chronic (ie>48hours) based on history  - likely due to volume depletion/decreased po hydration and intake along with insensible losses  - per team pt with no vomiting or diarrhea  - stop if not attempting to volume resuscitate patient as it contains 154 MeQ of sodium and likely cause of rise in sodium again. Change back to D5W and continue to encourage free water intake.  - increased sodium from 4/10 to 4/11 in setting of NaCl use over D5W  - check sodium levels daily   -glucose control to avoid osmotic diuresis with resultant hypernatremia     #hypokalemia- replete aggressively as patient also on hydroxychloroquine and want to avoid QTc prolongation. Continue to monitor Mg as well.    #hypophosphatemia- replete as needed    #covid19 + - treatment per medicial team

## 2020-04-11 NOTE — PROGRESS NOTE ADULT - PROBLEM SELECTOR PLAN 1
- Na 165 --> 163 -> 158  - Nephrology following; c/w D5W @ 150. BMP Q8H. Aim for correction of no more than 8 mEq/24 hrs    Interval events:  - Likely in the setting of suspected dehydration from poor PO over the past 2 weeks. Patient received 2L IV fluid in the field by his daughter (EMT) prior to arrival.  Serum Na 167 despite IV NS repletion. Started on additional IV NS in the ED. - Currently 156  -will c/w d5w 100 cc/hr and re-check at 6 pm, goal correction of 8 mEQ Na in 24h  --goal Na on 4/12 AM is 148  -neprhology following, appreciate recs     Interval events:  - Likely in the setting of suspected dehydration from poor PO over the past 2 weeks. Patient received 2L IV fluid in the field by his daughter (EMT) prior to arrival.  Serum Na 167 despite IV NS repletion. Started on additional IV NS in the ED.

## 2020-04-11 NOTE — PROGRESS NOTE ADULT - ATTENDING COMMENTS
74M no PMH (on no medications) who presented to the ED with generalized weakness and confusion in the setting of poor PO and depressed mood, found to be hypernatremic and hypoxic with COVID infection. Currently on plaquenil - completed course.   Hypernatremia improved to 150s, on D5W; Hypophosphatemia corrected  Stable on Room air, satting well  - Continue monitor  - Monitor electrolytes  - DVT prop: on Lovenox  - Psych consult for depression, AMS on admission  - Discharge planning; plan for home PT; Social work for discharge planning  Discussed case with medicine intern.

## 2020-04-11 NOTE — DISCHARGE NOTE PROVIDER - NSDCFUADDINST_GEN_ALL_CORE_FT
Please  your medications at  [facility]  Address: [address], [place] [address]

## 2020-04-11 NOTE — PROGRESS NOTE ADULT - PROBLEM SELECTOR PLAN 7
- Discussed GOC with patient's daughterJulieta  - Patient is full code at this time  - HCPs are patient's wife and daughter [phone]

## 2020-04-11 NOTE — DISCHARGE NOTE PROVIDER - PROVIDER TOKENS
FREE:[LAST:[PCP],PHONE:[(   )    -],FAX:[(   )    -]] FREE:[LAST:[[facility]],PHONE:[(   )    -],FAX:[(   )    -]]

## 2020-04-11 NOTE — PROGRESS NOTE ADULT - SUBJECTIVE AND OBJECTIVE BOX
Medicine Progress Note    Patient is a 74y old  Male who presents with a chief complaint of Suspected COVID, hypoxia, AMS, Hypernatremia (11 Apr 2020 06:54)      SUBJECTIVE / OVERNIGHT EVENTS: restarted NS at night given elevated Na of 157, this AM d/c'd NS, started free water PO.     ADDITIONAL REVIEW OF SYSTEMS: No CP, SOB, fever, chills, nausea, vomiting, diarrhea, abdominal pain.     MEDICATIONS  (STANDING):  enoxaparin Injectable 40 milliGRAM(s) SubCutaneous daily  hydroxychloroquine   Oral   hydroxychloroquine 200 milliGRAM(s) Oral every 12 hours  multivitamin 1 Tablet(s) Oral daily    MEDICATIONS  (PRN):    CAPILLARY BLOOD GLUCOSE        I&O's Summary    10 Apr 2020 07:01  -  11 Apr 2020 07:00  --------------------------------------------------------  IN: 1410 mL / OUT: 1700 mL / NET: -290 mL    11 Apr 2020 07:01  -  11 Apr 2020 09:27  --------------------------------------------------------  IN: 240 mL / OUT: 0 mL / NET: 240 mL        PHYSICAL EXAM:  Vital Signs Last 24 Hrs  T(C): 36.4 (11 Apr 2020 08:00), Max: 37.1 (10 Apr 2020 22:32)  T(F): 97.6 (11 Apr 2020 08:00), Max: 98.7 (10 Apr 2020 22:32)  HR: 78 (11 Apr 2020 08:00) (78 - 92)  BP: 124/72 (11 Apr 2020 08:00) (111/65 - 126/68)  BP(mean): --  RR: 20 (11 Apr 2020 08:00) (18 - 20)  SpO2: 96% (11 Apr 2020 08:00) (94% - 96%)    CONSTITUTIONAL: Thin and sad-appearing older gentleman. No acute distress.  EYES: No scleral icterus. No conjunctival injection.  ENT: Slightly dry oral mucosa.   RESPIRATORY: CTAB. No wheezes, rales, or rhonchi. No accessory muscle use. No apparent respiratory distress.  CARDIOVASCULAR: +S1/S2. No audible S3/S4. Regular rate and rhythm. No murmurs, rubs, or gallops.    GASTROINTESTINAL: Soft, nontender, nondistended. +BS. No rebound or guarding.   EXTREMITY: No LE swelling or edema. EXTs warm to touch.  MUSCULOSKELETAL: Spontaneous movement in all extremities.  DERMATOLOGICAL: No abnormal rashes or lesions.  NEUROLOGICAL: Nonfocal.  PSYCHIATRIC: Appears slightly depressed.    LABS:                        10.0   3.81  )-----------( 270      ( 11 Apr 2020 07:13 )             32.8     04-11    156<H>  |  119<H>  |  16  ----------------------------<  123<H>  3.9   |  26  |  0.85    Ca    9.5      11 Apr 2020 07:12  Phos  2.5     04-11  Mg     2.3     04-11    TPro  6.3  /  Alb  2.9<L>  /  TBili  1.2  /  DBili  x   /  AST  92<H>  /  ALT  64<H>  /  AlkPhos  77  04-11              COVID-19 PCR: Detected (06 Apr 2020 19:25)      RADIOLOGY & ADDITIONAL TESTS:  Imaging from Last 24 Hours:    Electrocardiogram/QTc Interval:    COORDINATION OF CARE:  Care Discussed with Consultants/Other Providers:

## 2020-04-11 NOTE — DISCHARGE NOTE PROVIDER - NSFOLLOWUPCLINICS_GEN_ALL_ED_FT
[facility]  Adolescent Medicine  [address] [place] [address]  Phone: [phone]  Fax:   Follow Up Time: 2 weeks [facility]  Psychiatry  [address] [place] [address]  Phone: [phone]  Fax:   Follow Up Time: 2 weeks

## 2020-04-12 LAB
ALBUMIN SERPL ELPH-MCNC: 2.9 G/DL — LOW (ref 3.3–5)
ALP SERPL-CCNC: 84 U/L — SIGNIFICANT CHANGE UP (ref 40–120)
ALT FLD-CCNC: 89 U/L — HIGH (ref 10–45)
ANION GAP SERPL CALC-SCNC: 12 MMOL/L — SIGNIFICANT CHANGE UP (ref 5–17)
ANION GAP SERPL CALC-SCNC: 12 MMOL/L — SIGNIFICANT CHANGE UP (ref 5–17)
AST SERPL-CCNC: 106 U/L — HIGH (ref 10–40)
BILIRUB SERPL-MCNC: 1 MG/DL — SIGNIFICANT CHANGE UP (ref 0.2–1.2)
BUN SERPL-MCNC: 13 MG/DL — SIGNIFICANT CHANGE UP (ref 7–23)
BUN SERPL-MCNC: 13 MG/DL — SIGNIFICANT CHANGE UP (ref 7–23)
CALCIUM SERPL-MCNC: 9.7 MG/DL — SIGNIFICANT CHANGE UP (ref 8.4–10.5)
CALCIUM SERPL-MCNC: 9.7 MG/DL — SIGNIFICANT CHANGE UP (ref 8.4–10.5)
CHLORIDE SERPL-SCNC: 107 MMOL/L — SIGNIFICANT CHANGE UP (ref 96–108)
CHLORIDE SERPL-SCNC: 111 MMOL/L — HIGH (ref 96–108)
CO2 SERPL-SCNC: 25 MMOL/L — SIGNIFICANT CHANGE UP (ref 22–31)
CO2 SERPL-SCNC: 26 MMOL/L — SIGNIFICANT CHANGE UP (ref 22–31)
CREAT SERPL-MCNC: 0.76 MG/DL — SIGNIFICANT CHANGE UP (ref 0.5–1.3)
CREAT SERPL-MCNC: 0.78 MG/DL — SIGNIFICANT CHANGE UP (ref 0.5–1.3)
CRP SERPL-MCNC: 3.31 MG/DL — HIGH (ref 0–0.4)
D DIMER BLD IA.RAPID-MCNC: 244 NG/ML DDU — HIGH
FERRITIN SERPL-MCNC: 1672 NG/ML — HIGH (ref 30–400)
GLUCOSE SERPL-MCNC: 152 MG/DL — HIGH (ref 70–99)
GLUCOSE SERPL-MCNC: 195 MG/DL — HIGH (ref 70–99)
MAGNESIUM SERPL-MCNC: 2.2 MG/DL — SIGNIFICANT CHANGE UP (ref 1.6–2.6)
MAGNESIUM SERPL-MCNC: 2.3 MG/DL — SIGNIFICANT CHANGE UP (ref 1.6–2.6)
PHOSPHATE SERPL-MCNC: 2.2 MG/DL — LOW (ref 2.5–4.5)
PHOSPHATE SERPL-MCNC: 2.9 MG/DL — SIGNIFICANT CHANGE UP (ref 2.5–4.5)
POTASSIUM SERPL-MCNC: 3.8 MMOL/L — SIGNIFICANT CHANGE UP (ref 3.5–5.3)
POTASSIUM SERPL-MCNC: 4 MMOL/L — SIGNIFICANT CHANGE UP (ref 3.5–5.3)
POTASSIUM SERPL-SCNC: 3.8 MMOL/L — SIGNIFICANT CHANGE UP (ref 3.5–5.3)
POTASSIUM SERPL-SCNC: 4 MMOL/L — SIGNIFICANT CHANGE UP (ref 3.5–5.3)
PROCALCITONIN SERPL-MCNC: 0.14 NG/ML — HIGH (ref 0.02–0.1)
PROT SERPL-MCNC: 6.6 G/DL — SIGNIFICANT CHANGE UP (ref 6–8.3)
SODIUM SERPL-SCNC: 145 MMOL/L — SIGNIFICANT CHANGE UP (ref 135–145)
SODIUM SERPL-SCNC: 148 MMOL/L — HIGH (ref 135–145)

## 2020-04-12 PROCEDURE — 99233 SBSQ HOSP IP/OBS HIGH 50: CPT | Mod: GC

## 2020-04-12 RX ORDER — SODIUM CHLORIDE 9 MG/ML
1000 INJECTION, SOLUTION INTRAVENOUS
Refills: 0 | Status: DISCONTINUED | OUTPATIENT
Start: 2020-04-12 | End: 2020-04-14

## 2020-04-12 RX ORDER — POTASSIUM PHOSPHATE, MONOBASIC POTASSIUM PHOSPHATE, DIBASIC 236; 224 MG/ML; MG/ML
15 INJECTION, SOLUTION INTRAVENOUS ONCE
Refills: 0 | Status: DISCONTINUED | OUTPATIENT
Start: 2020-04-12 | End: 2020-04-12

## 2020-04-12 RX ADMIN — ENOXAPARIN SODIUM 40 MILLIGRAM(S): 100 INJECTION SUBCUTANEOUS at 13:58

## 2020-04-12 RX ADMIN — Medication 1 TABLET(S): at 13:58

## 2020-04-12 RX ADMIN — SODIUM CHLORIDE 100 MILLILITER(S): 9 INJECTION, SOLUTION INTRAVENOUS at 13:58

## 2020-04-12 RX ADMIN — Medication 85 MILLIMOLE(S): at 15:57

## 2020-04-12 NOTE — PROGRESS NOTE ADULT - ATTENDING COMMENTS
74M no PMH (on no medications) who presented to the ED with generalized weakness and confusion in the setting of poor PO and depressed mood, found to be hypernatremic and hypoxic with COVID infection.   Completed Plaquenil tx  Pt has been stable on RA; Hypernatremia improving on D5W, will monitor electrolytes  - Continue monitoring  - Psych consult for poor PO intake due to possible  - Will obtain AM labs, monitor electrolytes  - DVT Prop: Lovenox   - Discharge planning - Home PT, Case management - rolling walker  Discussed case with medicine intern 74M no PMH (on no medications) who presented to the ED with generalized weakness and confusion in the setting of poor PO and depressed mood, found to be hypernatremic and hypoxic with COVID infection.   Completed Plaquenil tx  Pt has been stable on RA; Hypernatremia improving on D5W, will monitor electrolytes  - Continue monitoring  - Psych consult for poor PO intake due to possible depression  - Will obtain AM labs, monitor electrolytes  - DVT Prop: Lovenox   - Discharge planning - Home PT, Case management - rolling walker  Discussed case with medicine intern

## 2020-04-12 NOTE — PROGRESS NOTE ADULT - PROBLEM SELECTOR PLAN 7
- Discussed GOC with patient's daughterJulieta  - Patient is full code at this time  - HCPs are patient's wife and daughter [phone]

## 2020-04-12 NOTE — PROGRESS NOTE ADULT - SUBJECTIVE AND OBJECTIVE BOX
Medicine Progress Note    Patient is a 74y old  Male who presents with a chief complaint of Suspected COVID, hypoxia, AMS, Hypernatremia (11 Apr 2020 13:30)      SUBJECTIVE / OVERNIGHT EVENTS: No acute events overnight. D5w continued overnight     ADDITIONAL REVIEW OF SYSTEMS: No CP, SOB, fever, chills, nausea, vomiting, diarrhea, abdominal pain.     MEDICATIONS  (STANDING):  dextrose 5%. 1000 milliLiter(s) (100 mL/Hr) IV Continuous <Continuous>  enoxaparin Injectable 40 milliGRAM(s) SubCutaneous daily  multivitamin 1 Tablet(s) Oral daily  potassium phosphate IVPB 15 milliMole(s) IV Intermittent once    MEDICATIONS  (PRN):    CAPILLARY BLOOD GLUCOSE        I&O's Summary    11 Apr 2020 07:01  -  12 Apr 2020 07:00  --------------------------------------------------------  IN: 1480 mL / OUT: 1300 mL / NET: 180 mL        PHYSICAL EXAM:  Vital Signs Last 24 Hrs  T(C): 36.7 (12 Apr 2020 04:28), Max: 36.7 (11 Apr 2020 16:58)  T(F): 98 (12 Apr 2020 04:28), Max: 98 (11 Apr 2020 16:58)  HR: 85 (12 Apr 2020 04:28) (76 - 85)  BP: 102/61 (12 Apr 2020 04:28) (102/61 - 138/61)  BP(mean): --  RR: 18 (12 Apr 2020 04:28) (18 - 20)  SpO2: 94% (12 Apr 2020 04:28) (94% - 96%)  CONSTITUTIONAL: Thin and sad-appearing older gentleman. No acute distress.  EYES: No scleral icterus. No conjunctival injection.  ENT: Slightly dry oral mucosa.   RESPIRATORY: CTAB. No wheezes, rales, or rhonchi. No accessory muscle use. No apparent respiratory distress.  CARDIOVASCULAR: +S1/S2. No audible S3/S4. Regular rate and rhythm. No murmurs, rubs, or gallops.    GASTROINTESTINAL: Soft, nontender, nondistended. +BS. No rebound or guarding.   EXTREMITY: No LE swelling or edema. EXTs warm to touch.  MUSCULOSKELETAL: Spontaneous movement in all extremities.  DERMATOLOGICAL: No abnormal rashes or lesions.  NEUROLOGICAL: Nonfocal.  PSYCHIATRIC: Appears slightly depressed.  LABS:                        10.0   3.81  )-----------( 270      ( 11 Apr 2020 07:13 )             32.8     04-12    148<H>  |  111<H>  |  13  ----------------------------<  152<H>  4.0   |  25  |  0.78    Ca    9.7      12 Apr 2020 07:14  Phos  2.2     04-12  Mg     2.3     04-12    TPro  6.6  /  Alb  2.9<L>  /  TBili  1.0  /  DBili  x   /  AST  106<H>  /  ALT  89<H>  /  AlkPhos  84  04-12              COVID-19 PCR: Detected (06 Apr 2020 19:25)      RADIOLOGY & ADDITIONAL TESTS:  Imaging from Last 24 Hours:    Electrocardiogram/QTc Interval:    COORDINATION OF CARE:  Care Discussed with Consultants/Other Providers:

## 2020-04-12 NOTE — PROGRESS NOTE ADULT - PROBLEM SELECTOR PLAN 1
- 148 this AM  -goal correction 8 mEq in 24h, goal Na on 4/13 is 140, then d/c IVF and monitor   -nephrology following, appreciate recs     Interval events:  - Likely in the setting of suspected dehydration from poor PO over the past 2 weeks. Patient received 2L IV fluid in the field by his daughter (EMT) prior to arrival.  Serum Na 167 despite IV NS repletion. Started on additional IV NS in the ED. - 148 this AM  -goal correction 8 mEq in 24h, goal Na on 4/13 is 140, then d/c IVF and monitor   -nephrology following, appreciate recs   -psych consulted - pt stopped eating 2/2 feeling depressed after his mother passed away, this will likely repeat if he goes home, we would like psych to weigh in with their recommendations of possible SSRI   Interval events:  - Likely in the setting of suspected dehydration from poor PO over the past 2 weeks. Patient received 2L IV fluid in the field by his daughter (EMT) prior to arrival.  Serum Na 167 despite IV NS repletion. Started on additional IV NS in the ED.

## 2020-04-13 ENCOUNTER — TRANSCRIPTION ENCOUNTER (OUTPATIENT)
Age: 75
End: 2020-04-13

## 2020-04-13 DIAGNOSIS — F43.20 ADJUSTMENT DISORDER, UNSPECIFIED: ICD-10-CM

## 2020-04-13 DIAGNOSIS — F05 DELIRIUM DUE TO KNOWN PHYSIOLOGICAL CONDITION: ICD-10-CM

## 2020-04-13 LAB
ALBUMIN SERPL ELPH-MCNC: 3 G/DL — LOW (ref 3.3–5)
ALP SERPL-CCNC: 77 U/L — SIGNIFICANT CHANGE UP (ref 40–120)
ALT FLD-CCNC: 91 U/L — HIGH (ref 10–45)
ANION GAP SERPL CALC-SCNC: 10 MMOL/L — SIGNIFICANT CHANGE UP (ref 5–17)
ANION GAP SERPL CALC-SCNC: 9 MMOL/L — SIGNIFICANT CHANGE UP (ref 5–17)
AST SERPL-CCNC: 95 U/L — HIGH (ref 10–40)
BILIRUB SERPL-MCNC: 1 MG/DL — SIGNIFICANT CHANGE UP (ref 0.2–1.2)
BUN SERPL-MCNC: 11 MG/DL — SIGNIFICANT CHANGE UP (ref 7–23)
BUN SERPL-MCNC: 11 MG/DL — SIGNIFICANT CHANGE UP (ref 7–23)
CALCIUM SERPL-MCNC: 9.6 MG/DL — SIGNIFICANT CHANGE UP (ref 8.4–10.5)
CALCIUM SERPL-MCNC: 9.8 MG/DL — SIGNIFICANT CHANGE UP (ref 8.4–10.5)
CHLORIDE SERPL-SCNC: 105 MMOL/L — SIGNIFICANT CHANGE UP (ref 96–108)
CHLORIDE SERPL-SCNC: 106 MMOL/L — SIGNIFICANT CHANGE UP (ref 96–108)
CO2 SERPL-SCNC: 28 MMOL/L — SIGNIFICANT CHANGE UP (ref 22–31)
CO2 SERPL-SCNC: 28 MMOL/L — SIGNIFICANT CHANGE UP (ref 22–31)
CREAT SERPL-MCNC: 0.75 MG/DL — SIGNIFICANT CHANGE UP (ref 0.5–1.3)
CREAT SERPL-MCNC: 0.83 MG/DL — SIGNIFICANT CHANGE UP (ref 0.5–1.3)
D DIMER BLD IA.RAPID-MCNC: 201 NG/ML DDU — SIGNIFICANT CHANGE UP
GLUCOSE SERPL-MCNC: 117 MG/DL — HIGH (ref 70–99)
GLUCOSE SERPL-MCNC: 124 MG/DL — HIGH (ref 70–99)
HCT VFR BLD CALC: 30.7 % — LOW (ref 39–50)
HGB BLD-MCNC: 10 G/DL — LOW (ref 13–17)
MAGNESIUM SERPL-MCNC: 2.1 MG/DL — SIGNIFICANT CHANGE UP (ref 1.6–2.6)
MCHC RBC-ENTMCNC: 30.3 PG — SIGNIFICANT CHANGE UP (ref 27–34)
MCHC RBC-ENTMCNC: 32.6 GM/DL — SIGNIFICANT CHANGE UP (ref 32–36)
MCV RBC AUTO: 93 FL — SIGNIFICANT CHANGE UP (ref 80–100)
NRBC # BLD: 0 /100 WBCS — SIGNIFICANT CHANGE UP (ref 0–0)
PHOSPHATE SERPL-MCNC: 2.8 MG/DL — SIGNIFICANT CHANGE UP (ref 2.5–4.5)
PLATELET # BLD AUTO: 324 K/UL — SIGNIFICANT CHANGE UP (ref 150–400)
POTASSIUM SERPL-MCNC: 3.5 MMOL/L — SIGNIFICANT CHANGE UP (ref 3.5–5.3)
POTASSIUM SERPL-MCNC: 3.7 MMOL/L — SIGNIFICANT CHANGE UP (ref 3.5–5.3)
POTASSIUM SERPL-SCNC: 3.5 MMOL/L — SIGNIFICANT CHANGE UP (ref 3.5–5.3)
POTASSIUM SERPL-SCNC: 3.7 MMOL/L — SIGNIFICANT CHANGE UP (ref 3.5–5.3)
PROT SERPL-MCNC: 6.5 G/DL — SIGNIFICANT CHANGE UP (ref 6–8.3)
RBC # BLD: 3.3 M/UL — LOW (ref 4.2–5.8)
RBC # FLD: 11.9 % — SIGNIFICANT CHANGE UP (ref 10.3–14.5)
SODIUM SERPL-SCNC: 143 MMOL/L — SIGNIFICANT CHANGE UP (ref 135–145)
SODIUM SERPL-SCNC: 143 MMOL/L — SIGNIFICANT CHANGE UP (ref 135–145)
WBC # BLD: 4.32 K/UL — SIGNIFICANT CHANGE UP (ref 3.8–10.5)
WBC # FLD AUTO: 4.32 K/UL — SIGNIFICANT CHANGE UP (ref 3.8–10.5)

## 2020-04-13 PROCEDURE — 84443 ASSAY THYROID STIM HORMONE: CPT

## 2020-04-13 PROCEDURE — 80074 ACUTE HEPATITIS PANEL: CPT

## 2020-04-13 PROCEDURE — 83036 HEMOGLOBIN GLYCOSYLATED A1C: CPT

## 2020-04-13 PROCEDURE — 97162 PT EVAL MOD COMPLEX 30 MIN: CPT

## 2020-04-13 PROCEDURE — 83615 LACTATE (LD) (LDH) ENZYME: CPT

## 2020-04-13 PROCEDURE — 85652 RBC SED RATE AUTOMATED: CPT

## 2020-04-13 PROCEDURE — 99232 SBSQ HOSP IP/OBS MODERATE 35: CPT

## 2020-04-13 PROCEDURE — 93005 ELECTROCARDIOGRAM TRACING: CPT

## 2020-04-13 PROCEDURE — 82140 ASSAY OF AMMONIA: CPT

## 2020-04-13 PROCEDURE — 83930 ASSAY OF BLOOD OSMOLALITY: CPT

## 2020-04-13 PROCEDURE — 84295 ASSAY OF SERUM SODIUM: CPT

## 2020-04-13 PROCEDURE — 81001 URINALYSIS AUTO W/SCOPE: CPT

## 2020-04-13 PROCEDURE — 80053 COMPREHEN METABOLIC PANEL: CPT

## 2020-04-13 PROCEDURE — 82248 BILIRUBIN DIRECT: CPT

## 2020-04-13 PROCEDURE — 36415 COLL VENOUS BLD VENIPUNCTURE: CPT

## 2020-04-13 PROCEDURE — 84100 ASSAY OF PHOSPHORUS: CPT

## 2020-04-13 PROCEDURE — 85384 FIBRINOGEN ACTIVITY: CPT

## 2020-04-13 PROCEDURE — 85027 COMPLETE CBC AUTOMATED: CPT

## 2020-04-13 PROCEDURE — 83735 ASSAY OF MAGNESIUM: CPT

## 2020-04-13 PROCEDURE — 97530 THERAPEUTIC ACTIVITIES: CPT

## 2020-04-13 PROCEDURE — 82435 ASSAY OF BLOOD CHLORIDE: CPT

## 2020-04-13 PROCEDURE — 97116 GAIT TRAINING THERAPY: CPT

## 2020-04-13 PROCEDURE — 70450 CT HEAD/BRAIN W/O DYE: CPT

## 2020-04-13 PROCEDURE — 82330 ASSAY OF CALCIUM: CPT

## 2020-04-13 PROCEDURE — 87635 SARS-COV-2 COVID-19 AMP PRB: CPT

## 2020-04-13 PROCEDURE — 83540 ASSAY OF IRON: CPT

## 2020-04-13 PROCEDURE — 71045 X-RAY EXAM CHEST 1 VIEW: CPT

## 2020-04-13 PROCEDURE — 84145 PROCALCITONIN (PCT): CPT

## 2020-04-13 PROCEDURE — 85014 HEMATOCRIT: CPT

## 2020-04-13 PROCEDURE — 83550 IRON BINDING TEST: CPT

## 2020-04-13 PROCEDURE — 83935 ASSAY OF URINE OSMOLALITY: CPT

## 2020-04-13 PROCEDURE — 82955 ASSAY OF G6PD ENZYME: CPT

## 2020-04-13 PROCEDURE — 82728 ASSAY OF FERRITIN: CPT

## 2020-04-13 PROCEDURE — 99254 IP/OBS CNSLTJ NEW/EST MOD 60: CPT | Mod: GT

## 2020-04-13 PROCEDURE — 82803 BLOOD GASES ANY COMBINATION: CPT

## 2020-04-13 PROCEDURE — 80048 BASIC METABOLIC PNL TOTAL CA: CPT

## 2020-04-13 PROCEDURE — 82947 ASSAY GLUCOSE BLOOD QUANT: CPT

## 2020-04-13 PROCEDURE — 99222 1ST HOSP IP/OBS MODERATE 55: CPT

## 2020-04-13 PROCEDURE — 99232 SBSQ HOSP IP/OBS MODERATE 35: CPT | Mod: GC

## 2020-04-13 PROCEDURE — 86140 C-REACTIVE PROTEIN: CPT

## 2020-04-13 PROCEDURE — 85610 PROTHROMBIN TIME: CPT

## 2020-04-13 PROCEDURE — 87040 BLOOD CULTURE FOR BACTERIA: CPT

## 2020-04-13 PROCEDURE — 87086 URINE CULTURE/COLONY COUNT: CPT

## 2020-04-13 PROCEDURE — 83010 ASSAY OF HAPTOGLOBIN QUANT: CPT

## 2020-04-13 PROCEDURE — 85379 FIBRIN DEGRADATION QUANT: CPT

## 2020-04-13 PROCEDURE — 99285 EMERGENCY DEPT VISIT HI MDM: CPT

## 2020-04-13 PROCEDURE — 83605 ASSAY OF LACTIC ACID: CPT

## 2020-04-13 PROCEDURE — 84132 ASSAY OF SERUM POTASSIUM: CPT

## 2020-04-13 PROCEDURE — 85730 THROMBOPLASTIN TIME PARTIAL: CPT

## 2020-04-13 RX ORDER — MIRTAZAPINE 45 MG/1
1 TABLET, ORALLY DISINTEGRATING ORAL
Qty: 30 | Refills: 0
Start: 2020-04-13

## 2020-04-13 RX ORDER — LANOLIN ALCOHOL/MO/W.PET/CERES
1 CREAM (GRAM) TOPICAL
Qty: 30 | Refills: 0
Start: 2020-04-13

## 2020-04-13 RX ADMIN — Medication 1 TABLET(S): at 13:27

## 2020-04-13 RX ADMIN — ENOXAPARIN SODIUM 40 MILLIGRAM(S): 100 INJECTION SUBCUTANEOUS at 13:27

## 2020-04-13 NOTE — PROGRESS NOTE ADULT - SUBJECTIVE AND OBJECTIVE BOX
[facility] DIVISION OF KIDNEY DISEASES AND HYPERTENSION -- FOLLOW UP NOTE  --------------------------------------------------------------------------------  Chief Complaint:/subjective: per IM note for today ;  Patient sleeping well, not eating well. No dysphagia, but endorses no appetite. Endorses depressed mood, fatigue. still on d5w    24 hour events: No acute events overnight.         PAST HISTORY  --------------------------------------------------------------------------------  No significant changes to PMH, PSH, FHx, SHx, unless otherwise noted    ALLERGIES & MEDICATIONS  --------------------------------------------------------------------------------  Allergies    No Known Allergies    Intolerances      Standing Inpatient Medications  dextrose 5%. 1000 milliLiter(s) IV Continuous <Continuous>  enoxaparin Injectable 40 milliGRAM(s) SubCutaneous daily  multivitamin 1 Tablet(s) Oral daily    PRN Inpatient Medications      REVIEW OF SYSTEMS  --------------------------------------------------------------------------------     All other systems were reviewed and are negative per chart review, except as noted.    VITALS/PHYSICAL EXAM  --------------------------------------------------------------------------------  T(C): 36.7 (04-13-20 @ 09:41), Max: 36.8 (04-12-20 @ 21:40)  HR: 77 (04-13-20 @ 09:41) (77 - 90)  BP: 117/70 (04-13-20 @ 09:41) (111/68 - 128/77)  RR: 19 (04-13-20 @ 09:41) (18 - 20)  SpO2: 93% (04-13-20 @ 09:41) (93% - 97%)  Wt(kg): --  Adult Advanced Hemodynamics Last 24 Hrs  ABP: --  ABP(mean): --  CVP(mm Hg): --  CO: --  CI: --  PA: --  PA(mean): --  PCWP: --  SVR: --  SVRI: --        04-12-20 @ 07:01  -  04-13-20 @ 07:00  --------------------------------------------------------  IN: 2020 mL / OUT: 1300 mL / NET: 720 mL    04-13-20 @ 07:01  -  04-13-20 @ 11:51  --------------------------------------------------------  IN: 200 mL / OUT: 250 mL / NET: -50 mL      Physical Exam:  	deferred due to covid19 positive, and in effort to preserve PPE  used IM note for today for PE    LABS/STUDIES  --------------------------------------------------------------------------------              10.0   4.32  >-----------<  324      [04-13-20 @ 07:18]              30.7     Hemoglobin: 10.0 g/dL (04-13-20 @ 07:18)    Platelet Count - Automated: 324 K/uL (04-13-20 @ 07:18)    143  |  106  |  11  ----------------------------<  124      [04-13-20 @ 07:18]  3.5   |  28  |  0.83        Ca     9.6     [04-13-20 @ 07:18]      Mg     2.1     [04-13-20 @ 07:18]      Phos  2.8     [04-13-20 @ 07:18]    TPro  6.5  /  Alb  3.0  /  TBili  1.0  /  DBili  x   /  AST  95  /  ALT  91  /  AlkPhos  77  [04-13-20 @ 07:18]          Creatinine Trend:  SCr 0.83 [04-13 @ 07:18]  SCr 0.76 [04-12 @ 16:59]  SCr 0.78 [04-12 @ 07:14]  SCr 0.74 [04-11 @ 17:54]  SCr 0.85 [04-11 @ 07:12]    Urinalysis - [04-06-20 @ 19:39]      Color Dark Yellow / Appearance Clear / SG 1.014 / pH 6.0      Gluc Negative / Ketone Small  / Bili Small / Urobili 8 mg/dL       Blood Trace / Protein 100 / Leuk Est Negative / Nitrite Negative      RBC 4 / WBC 12 / Hyaline 6 / Gran  / Sq Epi  / Non Sq Epi 2 / Bacteria Negative    Urine Osmolality 440      [04-08-20 @ 04:21]    Iron 25, TIBC 192, %sat 13      [04-07-20 @ 08:11]  Ferritin 1672      [04-12-20 @ 10:24]  HbA1c 5.6      [04-07-20 @ 07:40]  TSH 0.63      [04-07-20 @ 08:28]    HBsAg Nonreact      [04-07-20 @ 08:17]  HCV 0.33, Nonreact      [04-07-20 @ 08:17]

## 2020-04-13 NOTE — PROGRESS NOTE ADULT - NSHPATTENDINGPLANDISCUSS_GEN_ALL_CORE
Medicine intern

## 2020-04-13 NOTE — PROGRESS NOTE ADULT - PROVIDER SPECIALTY LIST ADULT
Internal Medicine
Nephrology
Internal Medicine

## 2020-04-13 NOTE — PROGRESS NOTE ADULT - PROBLEM SELECTOR PLAN 8
Reason for Disposition  • [1] Chest pain lasts > 5 minutes AND [2] described as crushing, pressure-like, or heavy  • SEVERE chest pain  • [1] Chest pain lasts > 5 minutes AND [2] age > 30 AND [3] at least one cardiac risk factor (i.e., hypertension, diabetes, obesity, smoker or strong family history of heart disease)    Protocols used: CHEST PAIN-A-AH    
Transition of Care Status:  1. Name of PCP:  2. PCP contacted on admission: [  ] Y     [  ] N  3. PCP contacted at discharge: [  ] Y     [  ] N  4. Post-discharge appointment date and location:  5. Summary of handoff given to PCP:

## 2020-04-13 NOTE — PROGRESS NOTE ADULT - PROBLEM SELECTOR PLAN 4
- A&Ox2-3, though appears slightly depressed, dejected and desires to go home  - Monitor mental status  - Continue to treat metabolic and infectious causes of encephalopathy    Interval events:  - Per family, altered, A&Ox0 on presentation in the setting of depression s/p passing of his mother, with very poor PO, now found to be severely hypernatremic with hypoxia due to suspected COVID infection  - Exam non-focal; CTH negative - A&Ox2-3, though appears slightly depressed, dejected and desires to go home. Improvement in mentation, PSYCH consult for DC recommendations.  - Monitor mental status  - Continue to treat metabolic and infectious causes of encephalopathy    Interval events:  - Per family, altered, A&Ox0 on presentation in the setting of depression s/p passing of his mother, with very poor PO, now found to be severely hypernatremic with hypoxia due to suspected COVID infection  - Exam non-focal; CTH negative

## 2020-04-13 NOTE — BEHAVIORAL HEALTH ASSESSMENT NOTE - SUMMARY
This is a 74-year-old CAM patient ([place]) with no known PPH and no significant PMH (on no medications) who presented to the ED by EMS with generalized weakness and confusion on 4/6.. Per daughter (EMT) he is A&Ox3 at baseline, his mother passed away 3/26 and he has since been depressed, with total body weakness, decreased appetite, and change in behavior. Denied vomiting, diarrhea, pain, fever. Patient has been found to have COVID and electrolyte imbalance (Na+ of 167).    Patient likely presented with delirium on admission; delirium has been resolved by now. Possible normal grief with some vegetative symptoms noted on evaluation. No prominent mood sx, no Si/HI.

## 2020-04-13 NOTE — PROGRESS NOTE ADULT - ASSESSMENT
74 year old male presented with change in mental status, noted to be hypernatremic and covid19 positive     #Hypernatremia-  chronic (ie>48hours) based on history  -likely due to volume depletion/decreased po hydration and intake  -per team pt with no vomiting or diarrhea  - on d5w    -downtrending sodium  -check sodium levels daily   -glucose control to avoid osmotic diuresis with resultant hypernatremia     #hypokalemia- replete as needed    #hypophosphatemia- replete as needed    #covid19 +- treatment per medicial team    #anemia, unspecified, - monitor hb trend    will sign off  reconsult as needed

## 2020-04-13 NOTE — PROGRESS NOTE ADULT - REASON FOR ADMISSION
Suspected COVID, hypoxia, AMS, Hypernatremia

## 2020-04-13 NOTE — PROGRESS NOTE ADULT - PROBLEM SELECTOR PLAN 2
- Completed plaquenil (4/7 - 4/12)  - Isolation precautions    Interval events:  - Presented with weakness, AMS  - Chest imaging consistent with multifocal pneumonia  - COVID19 PCR positive

## 2020-04-13 NOTE — PROGRESS NOTE ADULT - PROBLEM SELECTOR PLAN 6
- DVT PPE: Lovenox  - Diet: DASH/TLC diet to limit hypernatremia  - Dispo: Home with home PT (per PT recs 4/9)  - Code status: FULL CODE - DVT PPE: Lovenox  - Diet: DASH/TLC diet to limit hypernatremia  - Dispo: Home with home PT (per PT recs 4/9). Anticipate DC today or tomorrow.  - Code status: FULL CODE

## 2020-04-13 NOTE — PROGRESS NOTE ADULT - PROBLEM SELECTOR PLAN 1
- 143 this AM. Resolved. Likely hypovolemic hypernatremia 2/2 poor PO intake 2/2 depression/grief.  -D/C fluids, BMP daily. Encourage PO intake.  -nephrology following, appreciate recs   -psych consulted - pt stopped eating 2/2 feeling depressed after his mother passed away, this will likely repeat if he goes home, we would like psych to weigh in with their recommendations of possible SSRI, or appetite stimulant like mirtazapine.  Interval events:  - Likely in the setting of suspected dehydration from poor PO over the past 2 weeks. Patient received 2L IV fluid in the field by his daughter (EMT) prior to arrival.  Serum Na 167 despite IV NS repletion. Started on additional IV NS in the ED.

## 2020-04-13 NOTE — BEHAVIORAL HEALTH ASSESSMENT NOTE - NSBHCHARTREVIEWLAB_PSY_A_CORE FT
10.0   4.32  )-----------( 324      ( 13 Apr 2020 07:18 )             30.7     04-13    143  |  105  |  11  ----------------------------<  117<H>  3.7   |  28  |  0.75    Ca    9.8      13 Apr 2020 14:16  Phos  2.8     04-13  Mg     2.1     04-13    TPro  6.5  /  Alb  3.0<L>  /  TBili  1.0  /  DBili  x   /  AST  95<H>  /  ALT  91<H>  /  AlkPhos  77  04-13

## 2020-04-13 NOTE — PROGRESS NOTE ADULT - SUBJECTIVE AND OBJECTIVE BOX
Medicine Progress Note    Patient is a 74y old  Male who presents with a chief complaint of Suspected COVID, hypoxia, AMS, Hypernatremia (11 Apr 2020 13:30)      SUBJECTIVE / OVERNIGHT EVENTS: No acute events overnight. D5w continued overnight     ADDITIONAL REVIEW OF SYSTEMS: No CP, SOB, fever, chills, nausea, vomiting, diarrhea, abdominal pain.     MEDICATIONS  (STANDING):  dextrose 5%. 1000 milliLiter(s) (100 mL/Hr) IV Continuous <Continuous>  enoxaparin Injectable 40 milliGRAM(s) SubCutaneous daily  multivitamin 1 Tablet(s) Oral daily    MEDICATIONS  (PRN):    CAPILLARY BLOOD GLUCOSE      I&O's Detail    12 Apr 2020 07:01  -  13 Apr 2020 07:00  --------------------------------------------------------  IN:    dextrose 5%.: 800 mL    IV PiggyBack: 500 mL    Oral Fluid: 720 mL  Total IN: 2020 mL    OUT:    Voided: 1300 mL  Total OUT: 1300 mL    Total NET: 720 mL    PHYSICAL EXAM:  Vital Signs Last 24 Hrs  T(C): 36.8 (13 Apr 2020 05:38), Max: 36.8 (12 Apr 2020 10:37)  T(F): 98.3 (13 Apr 2020 05:38), Max: 98.3 (12 Apr 2020 10:37)  HR: 86 (13 Apr 2020 05:38) (84 - 90)  BP: 114/67 (13 Apr 2020 05:38) (110/65 - 128/77)  BP(mean): --  RR: 20 (13 Apr 2020 05:38) (18 - 20)  SpO2: 93% (13 Apr 2020 05:38) (93% - 97%)    CONSTITUTIONAL: Thin and sad-appearing older gentleman. No acute distress.  EYES: No scleral icterus. No conjunctival injection.  ENT: Slightly dry oral mucosa.   RESPIRATORY: CTAB. No wheezes, rales, or rhonchi. No accessory muscle use. No apparent respiratory distress.  CARDIOVASCULAR: +S1/S2. No audible S3/S4. Regular rate and rhythm. No murmurs, rubs, or gallops.    GASTROINTESTINAL: Soft, nontender, nondistended. +BS. No rebound or guarding.   EXTREMITY: No LE swelling or edema. EXTs warm to touch.  MUSCULOSKELETAL: Spontaneous movement in all extremities.  DERMATOLOGICAL: No abnormal rashes or lesions.  NEUROLOGICAL: Nonfocal.  PSYCHIATRIC: Appears slightly depressed.  LABS:                                   10.0   4.32  )-----------( 324      ( 13 Apr 2020 07:18 )             30.7       04-13    143  |  106  |  11  ----------------------------<  124<H>  3.5   |  28  |  0.83    Ca    9.6      13 Apr 2020 07:18  Phos  2.8     04-13  Mg     2.1     04-13    TPro  6.5  /  Alb  3.0<L>  /  TBili  1.0  /  DBili  x   /  AST  95<H>  /  ALT  91<H>  /  AlkPhos  77  04-13                COVID-19 PCR: Detected (06 Apr 2020 19:25)      RADIOLOGY & ADDITIONAL TESTS:  Imaging from Last 24 Hours:    Electrocardiogram/QTc Interval:    COORDINATION OF CARE:  Care Discussed with Consultants/Other Providers: Medicine Progress Note    Patient is a 74y old  Male who presents with a chief complaint of Suspected COVID, hypoxia, AMS, Hypernatremia (11 Apr 2020 13:30)      SUBJECTIVE / OVERNIGHT EVENTS: No acute events overnight. D5w continued overnight. Patient sleeping well, not eating well. No dysphagia, but endorses no appetite. Endorses depressed mood, fatigue.     ADDITIONAL REVIEW OF SYSTEMS: No CP, SOB, fever, chills, nausea, vomiting, diarrhea, abdominal pain.     MEDICATIONS  (STANDING):  dextrose 5%. 1000 milliLiter(s) (100 mL/Hr) IV Continuous <Continuous>  enoxaparin Injectable 40 milliGRAM(s) SubCutaneous daily  multivitamin 1 Tablet(s) Oral daily    MEDICATIONS  (PRN):    CAPILLARY BLOOD GLUCOSE      I&O's Detail    12 Apr 2020 07:01  -  13 Apr 2020 07:00  --------------------------------------------------------  IN:    dextrose 5%.: 800 mL    IV PiggyBack: 500 mL    Oral Fluid: 720 mL  Total IN: 2020 mL    OUT:    Voided: 1300 mL  Total OUT: 1300 mL    Total NET: 720 mL    PHYSICAL EXAM:  Vital Signs Last 24 Hrs  T(C): 36.8 (13 Apr 2020 05:38), Max: 36.8 (12 Apr 2020 10:37)  T(F): 98.3 (13 Apr 2020 05:38), Max: 98.3 (12 Apr 2020 10:37)  HR: 86 (13 Apr 2020 05:38) (84 - 90)  BP: 114/67 (13 Apr 2020 05:38) (110/65 - 128/77)  BP(mean): --  RR: 20 (13 Apr 2020 05:38) (18 - 20)  SpO2: 93% (13 Apr 2020 05:38) (93% - 97%)    CONSTITUTIONAL: Thin and sad-appearing older gentleman. No acute distress.  EYES: No scleral icterus. No conjunctival injection.  ENT: Slightly dry oral mucosa.   RESPIRATORY: CTAB. No wheezes, rales, or rhonchi. No accessory muscle use. No apparent respiratory distress.  CARDIOVASCULAR: +S1/S2. No audible S3/S4. Regular rate and rhythm. No murmurs, rubs, or gallops.    GASTROINTESTINAL: Soft, nontender, nondistended. +BS. No rebound or guarding.   EXTREMITY: No LE swelling or edema. EXTs warm to touch.  MUSCULOSKELETAL: Spontaneous movement in all extremities.  DERMATOLOGICAL: No abnormal rashes or lesions.  NEUROLOGICAL: Nonfocal.  PSYCHIATRIC: Appears slightly depressed.  LABS:                                   10.0   4.32  )-----------( 324      ( 13 Apr 2020 07:18 )             30.7       04-13    143  |  106  |  11  ----------------------------<  124<H>  3.5   |  28  |  0.83    Ca    9.6      13 Apr 2020 07:18  Phos  2.8     04-13  Mg     2.1     04-13    TPro  6.5  /  Alb  3.0<L>  /  TBili  1.0  /  DBili  x   /  AST  95<H>  /  ALT  91<H>  /  AlkPhos  77  04-13                COVID-19 PCR: Detected (06 Apr 2020 19:25)      RADIOLOGY & ADDITIONAL TESTS:  Imaging from Last 24 Hours:    Electrocardiogram/QTc Interval:    COORDINATION OF CARE:  Care Discussed with Consultants/Other Providers:

## 2020-04-13 NOTE — DISCHARGE NOTE NURSING/CASE MANAGEMENT/SOCIAL WORK - PATIENT PORTAL LINK FT
You can access the FollowMyHealth Patient Portal offered by [facility] by registering at the following website: [email or web address]. By joining [facility]’s FollowMyHealth portal, you will also be able to view your health information using other applications (apps) compatible with our system.

## 2020-04-13 NOTE — BEHAVIORAL HEALTH ASSESSMENT NOTE - NSBHCHARTREVIEWIMAGING_PSY_A_CORE FT
< from: CT Head No Cont (04.06.20 @ 19:35) >    IMPRESSION:    No acute intracranial hemorrhage, mass effect, vasogenic edema, or evidence of acute territorial infarct.      < end of copied text >

## 2020-04-13 NOTE — BEHAVIORAL HEALTH ASSESSMENT NOTE - NSBHCHARTREVIEWINVESTIGATE_PSY_A_CORE FT
< from: 12 Lead ECG (04.06.20 @ 20:20) >      Ventricular Rate 100 BPM    Atrial Rate 100 BPM    P-R Interval 120 ms    QRS Duration 78 ms    Q-T Interval 318 ms    QTC Calculation(Bezet) 410 ms      < end of copied text >

## 2020-04-13 NOTE — BEHAVIORAL HEALTH ASSESSMENT NOTE - HPI (INCLUDE ILLNESS QUALITY, SEVERITY, DURATION, TIMING, CONTEXT, MODIFYING FACTORS, ASSOCIATED SIGNS AND SYMPTOMS)
This is a 74-year-old [facility] patient ([place]) with no known PPH and no significant PMH (on no medications) who presented to the ED by EMS with generalized weakness and confusion on .. Per daughter (EMT) he is A&Ox3 at baseline, his mother passed away 3/26 and he has since been depressed, with total body weakness, decreased appetite, and change in behavior. Denied vomiting, diarrhea, pain, fever. Patient has been found to have COVID and electrolyte imbalance (Na+ of 167).    On evaluation, patient is calm, collected, cooperative. He appears cheerful, albeit significantly weak. He states he does not recall how he was when he came here, he does not know what day it was or how long he has been here. He is now feeling significantly better, knows where he is, knows the date, and appears fully oriented and coherent. He states his mood is fine, denies feeling depressed. States his mother  recently at the age of 97. She was fairly healthy and  within a day. He feels like he is mourning her death, but does not feel he is emotionally struggling. He denies hopelessness, helplessness, any SI/HI, or other prominent mood symptoms. Concedes having low energy and some problems with sleep. Denies any other psych. or anxiety-related symptoms. Denies substance abuse. Denies prior psychiatric history.

## 2020-04-13 NOTE — PROGRESS NOTE ADULT - PROBLEM SELECTOR PLAN 7
- Discussed GOC with patient's daughterJulieta  - Patient is full code at this time  - HCPs are patient's wife and daughter [phone]

## 2020-04-13 NOTE — PROGRESS NOTE ADULT - ATTENDING COMMENTS
74M no PMH (on no medications) who presented to the ED with generalized weakness and confusion in the setting of poor PO and depressed mood, found to be hypernatremic and hypoxic with COVID infection.   Completed Plaquenil tx  Pt has been stable on RA; 95% O2 at rest, and 95% O2 with ambulation. Hypernatremia improved on D5W, will monitor electrolytes  - Continue monitoring. Will follow-up repeat BMP prior to discharge to ensure that Na and electrolytes are stable.   - Psych consult for poor PO intake due to possible depression. Will follow-up with recommendations prior to discharge.   - DVT Prop: Lovenox. D-dimer is within normal limits and IMPROV score is 1-2. Low risk of VTE outpatient. Will not send home on anticoagulation.   - Discharge planning - Home PT, Case management - rolling walker  Plan for discharge later this afternoon. Daughter was made aware of possible discharge and will return late afternoon to  patient.

## 2020-04-14 VITALS
TEMPERATURE: 98 F | RESPIRATION RATE: 20 BRPM | HEART RATE: 81 BPM | DIASTOLIC BLOOD PRESSURE: 71 MMHG | OXYGEN SATURATION: 95 % | SYSTOLIC BLOOD PRESSURE: 124 MMHG

## 2020-11-27 NOTE — PHYSICAL THERAPY INITIAL EVALUATION ADULT - WEIGHT-BEARING RESTRICTIONS: SIT/STAND, REHAB EVAL
Patient Seen in: [facility]    History   Patient presents with:  Testing  Loss Of Smell Or Taste    Stated Complaint: chills/body aches/fatigue/loss of smell/taste    HPI    Patient here with cough, congestion for 4 days.   No travel, no known bilateral, conjunctiva clear  EARS:tm's clear bilateral  NOSE: nasal turbinates boggy  NECK: supple, no adenopathy, no thyromegaly  THROAT: pnd noted, post phaynx injected,  LUNGS: no accessory use, increased upper airway sounds, ctab  CARDIO: RRR without full weight-bearing

## 2021-04-03 NOTE — DISCHARGE NOTE PROVIDER - CARE PROVIDER_API CALL
PCP,   Phone: (   )    -  Fax: (   )    -  Follow Up Time: [facility],   Phone: (   )    -  Fax: (   )    -  Follow Up Time: Yes

## 2023-04-25 NOTE — BEHAVIORAL HEALTH ASSESSMENT NOTE - NSBHCHARTREVIEWVS_PSY_A_CORE FT
Continue with xanax as needed.     Is going to grief counselor this Friday.     Is unable to go to work due to frequency of panic attacks. Work note provided until further evaluation.     Distraction, keep a journal, meditation, yoga, stress relieving strategies.    Vital Signs Last 24 Hrs  T(C): 36.4 (13 Apr 2020 13:07), Max: 36.8 (12 Apr 2020 21:40)  T(F): 97.6 (13 Apr 2020 13:07), Max: 98.3 (13 Apr 2020 05:38)  HR: 86 (13 Apr 2020 13:07) (77 - 89)  BP: 118/72 (13 Apr 2020 13:07) (112/73 - 128/77)  BP(mean): --  RR: 20 (13 Apr 2020 14:02) (18 - 20)  SpO2: 95% (13 Apr 2020 14:02) (93% - 97%)  T(C): 36.4 (04-13-20 @ 13:07), Max: 36.8 (04-12-20 @ 21:40)  HR: 86 (04-13-20 @ 13:07) (77 - 89)  BP: 118/72 (04-13-20 @ 13:07) (112/73 - 128/77)  RR: 20 (04-13-20 @ 14:02) (18 - 20)  SpO2: 95% (04-13-20 @ 14:02) (93% - 97%)  Wt(kg): --
